# Patient Record
Sex: MALE | Race: WHITE | NOT HISPANIC OR LATINO | URBAN - METROPOLITAN AREA
[De-identification: names, ages, dates, MRNs, and addresses within clinical notes are randomized per-mention and may not be internally consistent; named-entity substitution may affect disease eponyms.]

---

## 2017-06-14 ENCOUNTER — INPATIENT (INPATIENT)
Facility: HOSPITAL | Age: 62
LOS: 3 days | Discharge: HOME | DRG: 306 | End: 2017-06-18
Attending: THORACIC SURGERY (CARDIOTHORACIC VASCULAR SURGERY) | Admitting: THORACIC SURGERY (CARDIOTHORACIC VASCULAR SURGERY)
Payer: MEDICARE

## 2017-06-14 VITALS
TEMPERATURE: 99 F | HEART RATE: 58 BPM | OXYGEN SATURATION: 99 % | SYSTOLIC BLOOD PRESSURE: 136 MMHG | RESPIRATION RATE: 18 BRPM | WEIGHT: 200.4 LBS | DIASTOLIC BLOOD PRESSURE: 54 MMHG | HEIGHT: 69 IN

## 2017-06-14 DIAGNOSIS — E78.5 HYPERLIPIDEMIA, UNSPECIFIED: ICD-10-CM

## 2017-06-14 DIAGNOSIS — I10 ESSENTIAL (PRIMARY) HYPERTENSION: ICD-10-CM

## 2017-06-14 DIAGNOSIS — I50.9 HEART FAILURE, UNSPECIFIED: ICD-10-CM

## 2017-06-14 DIAGNOSIS — Z98.890 OTHER SPECIFIED POSTPROCEDURAL STATES: Chronic | ICD-10-CM

## 2017-06-14 DIAGNOSIS — I50.33 ACUTE ON CHRONIC DIASTOLIC (CONGESTIVE) HEART FAILURE: ICD-10-CM

## 2017-06-14 DIAGNOSIS — I25.10 ATHEROSCLEROTIC HEART DISEASE OF NATIVE CORONARY ARTERY WITHOUT ANGINA PECTORIS: ICD-10-CM

## 2017-06-14 DIAGNOSIS — I34.0 NONRHEUMATIC MITRAL (VALVE) INSUFFICIENCY: ICD-10-CM

## 2017-06-14 DIAGNOSIS — R09.02 HYPOXEMIA: ICD-10-CM

## 2017-06-14 DIAGNOSIS — T82.897D OTHER SPECIFIED COMPLICATION OF CARDIAC PROSTHETIC DEVICES, IMPLANTS AND GRAFTS, SUBSEQUENT ENCOUNTER: Chronic | ICD-10-CM

## 2017-06-14 DIAGNOSIS — E11.9 TYPE 2 DIABETES MELLITUS WITHOUT COMPLICATIONS: ICD-10-CM

## 2017-06-14 DIAGNOSIS — Z89.9 ACQUIRED ABSENCE OF LIMB, UNSPECIFIED: Chronic | ICD-10-CM

## 2017-06-14 LAB
ALBUMIN SERPL ELPH-MCNC: 3.9 G/DL — SIGNIFICANT CHANGE UP (ref 3.3–5.2)
ALP SERPL-CCNC: 82 U/L — SIGNIFICANT CHANGE UP (ref 40–120)
ALT FLD-CCNC: 19 U/L — SIGNIFICANT CHANGE UP
ANION GAP SERPL CALC-SCNC: 13 MMOL/L — SIGNIFICANT CHANGE UP (ref 5–17)
APTT BLD: 37.5 SEC — HIGH (ref 27.5–37.4)
AST SERPL-CCNC: 22 U/L — SIGNIFICANT CHANGE UP
BASOPHILS # BLD AUTO: 0 K/UL — SIGNIFICANT CHANGE UP (ref 0–0.2)
BASOPHILS NFR BLD AUTO: 0.3 % — SIGNIFICANT CHANGE UP (ref 0–2)
BILIRUB SERPL-MCNC: 0.4 MG/DL — SIGNIFICANT CHANGE UP (ref 0.4–2)
BLD GP AB SCN SERPL QL: SIGNIFICANT CHANGE UP
BUN SERPL-MCNC: 37 MG/DL — HIGH (ref 8–20)
CALCIUM SERPL-MCNC: 9.4 MG/DL — SIGNIFICANT CHANGE UP (ref 8.6–10.2)
CHLORIDE SERPL-SCNC: 92 MMOL/L — LOW (ref 98–107)
CO2 SERPL-SCNC: 31 MMOL/L — HIGH (ref 22–29)
CREAT SERPL-MCNC: 1.53 MG/DL — HIGH (ref 0.5–1.3)
EOSINOPHIL # BLD AUTO: 0.3 K/UL — SIGNIFICANT CHANGE UP (ref 0–0.5)
EOSINOPHIL NFR BLD AUTO: 4.7 % — SIGNIFICANT CHANGE UP (ref 0–5)
GLUCOSE SERPL-MCNC: 219 MG/DL — HIGH (ref 70–115)
HBA1C BLD-MCNC: 8 % — HIGH (ref 4–5.6)
HCT VFR BLD CALC: 38.6 % — LOW (ref 42–52)
HGB BLD-MCNC: 12.1 G/DL — LOW (ref 14–18)
INR BLD: 1.04 RATIO — SIGNIFICANT CHANGE UP (ref 0.88–1.16)
LYMPHOCYTES # BLD AUTO: 1.1 K/UL — SIGNIFICANT CHANGE UP (ref 1–4.8)
LYMPHOCYTES # BLD AUTO: 15.5 % — LOW (ref 20–55)
MCHC RBC-ENTMCNC: 27.2 PG — SIGNIFICANT CHANGE UP (ref 27–31)
MCHC RBC-ENTMCNC: 31.3 G/DL — LOW (ref 32–36)
MCV RBC AUTO: 86.7 FL — SIGNIFICANT CHANGE UP (ref 80–94)
MONOCYTES # BLD AUTO: 0.6 K/UL — SIGNIFICANT CHANGE UP (ref 0–0.8)
MONOCYTES NFR BLD AUTO: 8.5 % — SIGNIFICANT CHANGE UP (ref 3–10)
NEUTROPHILS # BLD AUTO: 5 K/UL — SIGNIFICANT CHANGE UP (ref 1.8–8)
NEUTROPHILS NFR BLD AUTO: 70.9 % — SIGNIFICANT CHANGE UP (ref 37–73)
NT-PROBNP SERPL-SCNC: 678 PG/ML — HIGH (ref 0–300)
PLATELET # BLD AUTO: 205 K/UL — SIGNIFICANT CHANGE UP (ref 150–400)
POTASSIUM SERPL-MCNC: 5 MMOL/L — SIGNIFICANT CHANGE UP (ref 3.5–5.3)
POTASSIUM SERPL-SCNC: 5 MMOL/L — SIGNIFICANT CHANGE UP (ref 3.5–5.3)
PREALB SERPL-MCNC: 20 MG/DL — SIGNIFICANT CHANGE UP (ref 18–38)
PROT SERPL-MCNC: 7.2 G/DL — SIGNIFICANT CHANGE UP (ref 6.6–8.7)
PROTHROM AB SERPL-ACNC: 11.4 SEC — SIGNIFICANT CHANGE UP (ref 9.8–12.7)
RBC # BLD: 4.45 M/UL — LOW (ref 4.6–6.2)
RBC # FLD: 16.2 % — HIGH (ref 11–15.6)
SODIUM SERPL-SCNC: 136 MMOL/L — SIGNIFICANT CHANGE UP (ref 135–145)
TSH SERPL-MCNC: 1.95 UIU/ML — SIGNIFICANT CHANGE UP (ref 0.27–4.2)
TYPE + AB SCN PNL BLD: SIGNIFICANT CHANGE UP
WBC # BLD: 7 K/UL — SIGNIFICANT CHANGE UP (ref 4.8–10.8)
WBC # FLD AUTO: 7 K/UL — SIGNIFICANT CHANGE UP (ref 4.8–10.8)

## 2017-06-14 PROCEDURE — 99221 1ST HOSP IP/OBS SF/LOW 40: CPT

## 2017-06-14 PROCEDURE — 93010 ELECTROCARDIOGRAM REPORT: CPT

## 2017-06-14 PROCEDURE — 71010: CPT | Mod: 26

## 2017-06-14 RX ORDER — INSULIN LISPRO 100/ML
VIAL (ML) SUBCUTANEOUS
Qty: 0 | Refills: 0 | Status: DISCONTINUED | OUTPATIENT
Start: 2017-06-14 | End: 2017-06-18

## 2017-06-14 RX ORDER — ASPIRIN/CALCIUM CARB/MAGNESIUM 324 MG
325 TABLET ORAL DAILY
Qty: 0 | Refills: 0 | Status: DISCONTINUED | OUTPATIENT
Start: 2017-06-14 | End: 2017-06-18

## 2017-06-14 RX ORDER — SODIUM CHLORIDE 9 MG/ML
1000 INJECTION, SOLUTION INTRAVENOUS
Qty: 0 | Refills: 0 | Status: DISCONTINUED | OUTPATIENT
Start: 2017-06-14 | End: 2017-06-18

## 2017-06-14 RX ORDER — TRAZODONE HCL 50 MG
100 TABLET ORAL AT BEDTIME
Qty: 0 | Refills: 0 | Status: DISCONTINUED | OUTPATIENT
Start: 2017-06-14 | End: 2017-06-18

## 2017-06-14 RX ORDER — ISOSORBIDE MONONITRATE 60 MG/1
60 TABLET, EXTENDED RELEASE ORAL DAILY
Qty: 0 | Refills: 0 | Status: DISCONTINUED | OUTPATIENT
Start: 2017-06-14 | End: 2017-06-16

## 2017-06-14 RX ORDER — DEXTROSE 50 % IN WATER 50 %
25 SYRINGE (ML) INTRAVENOUS ONCE
Qty: 0 | Refills: 0 | Status: DISCONTINUED | OUTPATIENT
Start: 2017-06-14 | End: 2017-06-18

## 2017-06-14 RX ORDER — SPIRONOLACTONE 25 MG/1
25 TABLET, FILM COATED ORAL DAILY
Qty: 0 | Refills: 0 | Status: DISCONTINUED | OUTPATIENT
Start: 2017-06-14 | End: 2017-06-15

## 2017-06-14 RX ORDER — CITALOPRAM 10 MG/1
40 TABLET, FILM COATED ORAL DAILY
Qty: 0 | Refills: 0 | Status: DISCONTINUED | OUTPATIENT
Start: 2017-06-14 | End: 2017-06-18

## 2017-06-14 RX ORDER — TAMSULOSIN HYDROCHLORIDE 0.4 MG/1
0.4 CAPSULE ORAL AT BEDTIME
Qty: 0 | Refills: 0 | Status: DISCONTINUED | OUTPATIENT
Start: 2017-06-14 | End: 2017-06-18

## 2017-06-14 RX ORDER — INSULIN GLARGINE 100 [IU]/ML
30 INJECTION, SOLUTION SUBCUTANEOUS AT BEDTIME
Qty: 0 | Refills: 0 | Status: DISCONTINUED | OUTPATIENT
Start: 2017-06-14 | End: 2017-06-15

## 2017-06-14 RX ORDER — TRAZODONE HCL 50 MG
50 TABLET ORAL AT BEDTIME
Qty: 0 | Refills: 0 | Status: DISCONTINUED | OUTPATIENT
Start: 2017-06-14 | End: 2017-06-18

## 2017-06-14 RX ORDER — FUROSEMIDE 40 MG
40 TABLET ORAL
Qty: 0 | Refills: 0 | Status: DISCONTINUED | OUTPATIENT
Start: 2017-06-14 | End: 2017-06-16

## 2017-06-14 RX ORDER — METOPROLOL TARTRATE 50 MG
100 TABLET ORAL DAILY
Qty: 0 | Refills: 0 | Status: DISCONTINUED | OUTPATIENT
Start: 2017-06-14 | End: 2017-06-16

## 2017-06-14 RX ORDER — DOCUSATE SODIUM 100 MG
100 CAPSULE ORAL THREE TIMES A DAY
Qty: 0 | Refills: 0 | Status: DISCONTINUED | OUTPATIENT
Start: 2017-06-14 | End: 2017-06-18

## 2017-06-14 RX ORDER — SODIUM CHLORIDE 9 MG/ML
3 INJECTION INTRAMUSCULAR; INTRAVENOUS; SUBCUTANEOUS EVERY 8 HOURS
Qty: 0 | Refills: 0 | Status: DISCONTINUED | OUTPATIENT
Start: 2017-06-14 | End: 2017-06-18

## 2017-06-14 RX ORDER — GLUCAGON INJECTION, SOLUTION 0.5 MG/.1ML
1 INJECTION, SOLUTION SUBCUTANEOUS ONCE
Qty: 0 | Refills: 0 | Status: DISCONTINUED | OUTPATIENT
Start: 2017-06-14 | End: 2017-06-18

## 2017-06-14 RX ORDER — FLUTICASONE PROPIONATE 50 MCG
2 SPRAY, SUSPENSION NASAL DAILY
Qty: 0 | Refills: 0 | Status: DISCONTINUED | OUTPATIENT
Start: 2017-06-14 | End: 2017-06-18

## 2017-06-14 RX ORDER — DEXTROSE 50 % IN WATER 50 %
1 SYRINGE (ML) INTRAVENOUS ONCE
Qty: 0 | Refills: 0 | Status: DISCONTINUED | OUTPATIENT
Start: 2017-06-14 | End: 2017-06-18

## 2017-06-14 RX ORDER — GABAPENTIN 400 MG/1
400 CAPSULE ORAL
Qty: 0 | Refills: 0 | Status: DISCONTINUED | OUTPATIENT
Start: 2017-06-14 | End: 2017-06-18

## 2017-06-14 RX ORDER — ATORVASTATIN CALCIUM 80 MG/1
80 TABLET, FILM COATED ORAL AT BEDTIME
Qty: 0 | Refills: 0 | Status: DISCONTINUED | OUTPATIENT
Start: 2017-06-14 | End: 2017-06-18

## 2017-06-14 RX ORDER — HEPARIN SODIUM 5000 [USP'U]/ML
5000 INJECTION INTRAVENOUS; SUBCUTANEOUS EVERY 8 HOURS
Qty: 0 | Refills: 0 | Status: DISCONTINUED | OUTPATIENT
Start: 2017-06-14 | End: 2017-06-18

## 2017-06-14 RX ORDER — INSULIN LISPRO 100/ML
5 VIAL (ML) SUBCUTANEOUS
Qty: 0 | Refills: 0 | Status: DISCONTINUED | OUTPATIENT
Start: 2017-06-14 | End: 2017-06-15

## 2017-06-14 RX ORDER — DEXTROSE 50 % IN WATER 50 %
12.5 SYRINGE (ML) INTRAVENOUS ONCE
Qty: 0 | Refills: 0 | Status: DISCONTINUED | OUTPATIENT
Start: 2017-06-14 | End: 2017-06-18

## 2017-06-14 RX ORDER — CLOPIDOGREL BISULFATE 75 MG/1
75 TABLET, FILM COATED ORAL DAILY
Qty: 0 | Refills: 0 | Status: DISCONTINUED | OUTPATIENT
Start: 2017-06-14 | End: 2017-06-18

## 2017-06-14 RX ORDER — PANTOPRAZOLE SODIUM 20 MG/1
40 TABLET, DELAYED RELEASE ORAL
Qty: 0 | Refills: 0 | Status: DISCONTINUED | OUTPATIENT
Start: 2017-06-14 | End: 2017-06-18

## 2017-06-14 RX ORDER — ACETAMINOPHEN 500 MG
650 TABLET ORAL EVERY 6 HOURS
Qty: 0 | Refills: 0 | Status: DISCONTINUED | OUTPATIENT
Start: 2017-06-14 | End: 2017-06-18

## 2017-06-14 RX ADMIN — INSULIN GLARGINE 30 UNIT(S): 100 INJECTION, SOLUTION SUBCUTANEOUS at 23:40

## 2017-06-14 RX ADMIN — SODIUM CHLORIDE 3 MILLILITER(S): 9 INJECTION INTRAMUSCULAR; INTRAVENOUS; SUBCUTANEOUS at 23:06

## 2017-06-14 RX ADMIN — HEPARIN SODIUM 5000 UNIT(S): 5000 INJECTION INTRAVENOUS; SUBCUTANEOUS at 23:41

## 2017-06-14 RX ADMIN — TAMSULOSIN HYDROCHLORIDE 0.4 MILLIGRAM(S): 0.4 CAPSULE ORAL at 23:43

## 2017-06-14 RX ADMIN — Medication 50 MILLIGRAM(S): at 23:44

## 2017-06-14 RX ADMIN — Medication 2 MILLIGRAM(S): at 23:41

## 2017-06-14 RX ADMIN — Medication 100 MILLIGRAM(S): at 23:43

## 2017-06-14 RX ADMIN — ATORVASTATIN CALCIUM 80 MILLIGRAM(S): 80 TABLET, FILM COATED ORAL at 23:42

## 2017-06-14 RX ADMIN — Medication 100 MILLIGRAM(S): at 23:44

## 2017-06-14 RX ADMIN — GABAPENTIN 400 MILLIGRAM(S): 400 CAPSULE ORAL at 23:42

## 2017-06-14 NOTE — H&P ADULT - PSH
Coronary stent occlusion, subsequent encounter    History of amputation  1/2 LLE great toe  1st and 2nd toes rt foot  History of cataract extraction    History of coronary artery bypass graft  TMR, 2001 [place]  History of transmyocardial revascularization    History of wrist fracture  left repaired

## 2017-06-14 NOTE — H&P ADULT - FAMILY HISTORY
Sibling  Still living? Yes, Estimated age: Age Unknown  Family history of CABG, Age at diagnosis: Age Unknown     Mother  Still living? Unknown  Family history of diabetes mellitus (DM), Age at diagnosis: Age Unknown

## 2017-06-14 NOTE — H&P ADULT - NSHPREVIEWOFSYSTEMS_GEN_ALL_CORE
General: alert awake, + Fatigue Claudication  Eyes: + Glasses No blurry vision   ENT : - hearing loss, - drainage  CVS :- CP, -palpitations,   Resp: + wheezing + SOB, - cough  GI: -N&V, - Appetite change   : -Frequency, -Dysuria    Psych: + Depression   Extremities: +Claudication + neuropathy + Edema b/l LE

## 2017-06-14 NOTE — H&P ADULT - PMH
Hooper's esophagus    CAD (coronary artery disease)  multiple MI, PCI, CABG/TMR  Charcot foot due to diabetes mellitus    CKD (chronic kidney disease) stage 2, GFR 60-89 ml/min    COPD (chronic obstructive pulmonary disease)    Diastolic dysfunction with acute on chronic heart failure    DM (diabetes mellitus)    Dyslipidemia    GERD (gastroesophageal reflux disease)    HLD (hyperlipidemia)    HTN (hypertension)    Hypoxia    Ischemic cardiomyopathy    Memory loss, short term    Morbid obesity    Non-rheumatic mitral regurgitation    NSTEMI (non-ST elevated myocardial infarction)    Obesity    PAD (peripheral artery disease)    Retinopathy    Sleep apnea    Spinal stenosis

## 2017-06-14 NOTE — H&P ADULT - NSHPPHYSICALEXAM_GEN_ALL_CORE
neuro:  Alert, awake NAD  Resp:  Decreased BS at base b/l  clear  Card:  RRR S1 S2 + MSI scar   Abd:  Soft NT ND + BS   Ext:  + Rt Charcot foot with great toe amputated, Left foot +tip of great toe amputated + DP pulse b/l 1+           1-2 + pitting edema b/l LE

## 2017-06-14 NOTE — H&P ADULT - ASSESSMENT
61 y/o M with h/o CAD with acute on chronic diastolic heart failure, presents with moderate to severe MR and transferred here for surgical evaluation with Dr Faulkner

## 2017-06-14 NOTE — H&P ADULT - ATTENDING COMMENTS
Agree with above.  Will get MARGARETH, PFTs, Pulmonary consult.  Evaluate possibility of surgical MVR or other percutaneous possibilities.

## 2017-06-14 NOTE — H&P ADULT - HISTORY OF PRESENT ILLNESS
This is a 61 y/o M with h/o known diastolic heart failure moderate MR ( ECHO in May) had a recent admission at [facility],  discharged on 5/31 and readmitted on 6/7 with CHF. Pt has a h/o of Dm ( on Insulin) multiple PCI ( on plavix) HTN, HLD, depression, CKD, PAD, GERD, TMR X 2,  Neuropathy, Charcot foot,  STML & balance issues  for which he sees a neurologist,  Home O2 the last  2 months for CHF ( Dr Gong placed pt on).  Pt was seen by Dr Gong in the office and advised pt to go to [facility] for Acute Decompensated  diastolic Heart Failure.  Pt states he feels better now being that hes been on lasix since admission and noticed that his LE edema has gone down.  Denies CP, HA, N & V, dizziness

## 2017-06-15 DIAGNOSIS — E11.52 TYPE 2 DIABETES MELLITUS WITH DIABETIC PERIPHERAL ANGIOPATHY WITH GANGRENE: ICD-10-CM

## 2017-06-15 DIAGNOSIS — J43.9 EMPHYSEMA, UNSPECIFIED: ICD-10-CM

## 2017-06-15 DIAGNOSIS — I10 ESSENTIAL (PRIMARY) HYPERTENSION: ICD-10-CM

## 2017-06-15 DIAGNOSIS — N18.2 CHRONIC KIDNEY DISEASE, STAGE 2 (MILD): ICD-10-CM

## 2017-06-15 DIAGNOSIS — I50.31 ACUTE DIASTOLIC (CONGESTIVE) HEART FAILURE: ICD-10-CM

## 2017-06-15 DIAGNOSIS — I25.118 ATHEROSCLEROTIC HEART DISEASE OF NATIVE CORONARY ARTERY WITH OTHER FORMS OF ANGINA PECTORIS: ICD-10-CM

## 2017-06-15 LAB
ALBUMIN SERPL ELPH-MCNC: 3.5 G/DL — SIGNIFICANT CHANGE UP (ref 3.3–5.2)
ALP SERPL-CCNC: 75 U/L — SIGNIFICANT CHANGE UP (ref 40–120)
ALT FLD-CCNC: 19 U/L — SIGNIFICANT CHANGE UP
ANION GAP SERPL CALC-SCNC: 9 MMOL/L — SIGNIFICANT CHANGE UP (ref 5–17)
AST SERPL-CCNC: 20 U/L — SIGNIFICANT CHANGE UP
BASE EXCESS BLDA CALC-SCNC: 5.2 MMOL/L — HIGH (ref -3–3)
BASOPHILS # BLD AUTO: 0 K/UL — SIGNIFICANT CHANGE UP (ref 0–0.2)
BASOPHILS NFR BLD AUTO: 0.3 % — SIGNIFICANT CHANGE UP (ref 0–2)
BILIRUB SERPL-MCNC: 0.4 MG/DL — SIGNIFICANT CHANGE UP (ref 0.4–2)
BLOOD GAS COMMENTS ARTERIAL: SIGNIFICANT CHANGE UP
BUN SERPL-MCNC: 35 MG/DL — HIGH (ref 8–20)
CALCIUM SERPL-MCNC: 9.3 MG/DL — SIGNIFICANT CHANGE UP (ref 8.6–10.2)
CHLORIDE SERPL-SCNC: 95 MMOL/L — LOW (ref 98–107)
CO2 SERPL-SCNC: 34 MMOL/L — HIGH (ref 22–29)
CREAT SERPL-MCNC: 1.39 MG/DL — HIGH (ref 0.5–1.3)
EOSINOPHIL # BLD AUTO: 0.4 K/UL — SIGNIFICANT CHANGE UP (ref 0–0.5)
EOSINOPHIL NFR BLD AUTO: 5.6 % — HIGH (ref 0–5)
GAS PNL BLDA: SIGNIFICANT CHANGE UP
GLUCOSE SERPL-MCNC: 164 MG/DL — HIGH (ref 70–115)
HBA1C BLD-MCNC: 8 % — HIGH (ref 4–5.6)
HCO3 BLDA-SCNC: 29 MMOL/L — HIGH (ref 20–26)
HCT VFR BLD CALC: 37.6 % — LOW (ref 42–52)
HGB BLD-MCNC: 11.9 G/DL — LOW (ref 14–18)
HOROWITZ INDEX BLDA+IHG-RTO: SIGNIFICANT CHANGE UP
LYMPHOCYTES # BLD AUTO: 1.2 K/UL — SIGNIFICANT CHANGE UP (ref 1–4.8)
LYMPHOCYTES # BLD AUTO: 18.6 % — LOW (ref 20–55)
MCHC RBC-ENTMCNC: 27.5 PG — SIGNIFICANT CHANGE UP (ref 27–31)
MCHC RBC-ENTMCNC: 31.6 G/DL — LOW (ref 32–36)
MCV RBC AUTO: 86.8 FL — SIGNIFICANT CHANGE UP (ref 80–94)
MONOCYTES # BLD AUTO: 0.6 K/UL — SIGNIFICANT CHANGE UP (ref 0–0.8)
MONOCYTES NFR BLD AUTO: 9.1 % — SIGNIFICANT CHANGE UP (ref 3–10)
NEUTROPHILS # BLD AUTO: 4.2 K/UL — SIGNIFICANT CHANGE UP (ref 1.8–8)
NEUTROPHILS NFR BLD AUTO: 66.2 % — SIGNIFICANT CHANGE UP (ref 37–73)
PA ADP PRP-ACNC: 123 PRU — SIGNIFICANT CHANGE UP (ref 14–435)
PCO2 BLDA: 50 MMHG — HIGH (ref 35–45)
PH BLDA: 7.4 — SIGNIFICANT CHANGE UP (ref 7.35–7.45)
PLATELET # BLD AUTO: 185 K/UL — SIGNIFICANT CHANGE UP (ref 150–400)
PO2 BLDA: 53 MMHG — LOW (ref 83–108)
POTASSIUM SERPL-MCNC: 4.1 MMOL/L — SIGNIFICANT CHANGE UP (ref 3.5–5.3)
POTASSIUM SERPL-SCNC: 4.1 MMOL/L — SIGNIFICANT CHANGE UP (ref 3.5–5.3)
PROT SERPL-MCNC: 6.6 G/DL — SIGNIFICANT CHANGE UP (ref 6.6–8.7)
RBC # BLD: 4.33 M/UL — LOW (ref 4.6–6.2)
RBC # FLD: 15.9 % — HIGH (ref 11–15.6)
SAO2 % BLDA: 86 % — LOW (ref 95–99)
SODIUM SERPL-SCNC: 138 MMOL/L — SIGNIFICANT CHANGE UP (ref 135–145)
WBC # BLD: 6.3 K/UL — SIGNIFICANT CHANGE UP (ref 4.8–10.8)
WBC # FLD AUTO: 6.3 K/UL — SIGNIFICANT CHANGE UP (ref 4.8–10.8)

## 2017-06-15 PROCEDURE — 71250 CT THORAX DX C-: CPT | Mod: 26

## 2017-06-15 RX ORDER — INSULIN LISPRO 100/ML
5 VIAL (ML) SUBCUTANEOUS
Qty: 0 | Refills: 0 | Status: DISCONTINUED | OUTPATIENT
Start: 2017-06-15 | End: 2017-06-18

## 2017-06-15 RX ORDER — INSULIN GLARGINE 100 [IU]/ML
15 INJECTION, SOLUTION SUBCUTANEOUS ONCE
Qty: 0 | Refills: 0 | Status: COMPLETED | OUTPATIENT
Start: 2017-06-15 | End: 2017-06-15

## 2017-06-15 RX ORDER — BUDESONIDE, MICRONIZED 100 %
1 POWDER (GRAM) MISCELLANEOUS DAILY
Qty: 0 | Refills: 0 | Status: DISCONTINUED | OUTPATIENT
Start: 2017-06-15 | End: 2017-06-15

## 2017-06-15 RX ORDER — INSULIN GLARGINE 100 [IU]/ML
30 INJECTION, SOLUTION SUBCUTANEOUS AT BEDTIME
Qty: 0 | Refills: 0 | Status: DISCONTINUED | OUTPATIENT
Start: 2017-06-16 | End: 2017-06-18

## 2017-06-15 RX ORDER — TIOTROPIUM BROMIDE 18 UG/1
1 CAPSULE ORAL; RESPIRATORY (INHALATION) DAILY
Qty: 0 | Refills: 0 | Status: DISCONTINUED | OUTPATIENT
Start: 2017-06-15 | End: 2017-06-18

## 2017-06-15 RX ORDER — MOMETASONE FUROATE 220 UG/1
1 INHALANT RESPIRATORY (INHALATION) DAILY
Qty: 0 | Refills: 0 | Status: DISCONTINUED | OUTPATIENT
Start: 2017-06-15 | End: 2017-06-18

## 2017-06-15 RX ORDER — SPIRONOLACTONE 25 MG/1
25 TABLET, FILM COATED ORAL
Qty: 0 | Refills: 0 | Status: DISCONTINUED | OUTPATIENT
Start: 2017-06-15 | End: 2017-06-16

## 2017-06-15 RX ADMIN — INSULIN GLARGINE 15 UNIT(S): 100 INJECTION, SOLUTION SUBCUTANEOUS at 21:37

## 2017-06-15 RX ADMIN — SODIUM CHLORIDE 3 MILLILITER(S): 9 INJECTION INTRAMUSCULAR; INTRAVENOUS; SUBCUTANEOUS at 06:37

## 2017-06-15 RX ADMIN — GABAPENTIN 400 MILLIGRAM(S): 400 CAPSULE ORAL at 17:27

## 2017-06-15 RX ADMIN — Medication 100 MILLIGRAM(S): at 06:46

## 2017-06-15 RX ADMIN — Medication 325 MILLIGRAM(S): at 14:19

## 2017-06-15 RX ADMIN — GABAPENTIN 400 MILLIGRAM(S): 400 CAPSULE ORAL at 14:24

## 2017-06-15 RX ADMIN — Medication 2: at 17:27

## 2017-06-15 RX ADMIN — ISOSORBIDE MONONITRATE 60 MILLIGRAM(S): 60 TABLET, EXTENDED RELEASE ORAL at 14:19

## 2017-06-15 RX ADMIN — GABAPENTIN 400 MILLIGRAM(S): 400 CAPSULE ORAL at 06:46

## 2017-06-15 RX ADMIN — ATORVASTATIN CALCIUM 80 MILLIGRAM(S): 80 TABLET, FILM COATED ORAL at 21:36

## 2017-06-15 RX ADMIN — SPIRONOLACTONE 25 MILLIGRAM(S): 25 TABLET, FILM COATED ORAL at 06:46

## 2017-06-15 RX ADMIN — Medication 100 MILLIGRAM(S): at 23:42

## 2017-06-15 RX ADMIN — Medication 6: at 14:20

## 2017-06-15 RX ADMIN — Medication 10 MILLIGRAM(S): at 06:46

## 2017-06-15 RX ADMIN — Medication 100 MILLIGRAM(S): at 21:36

## 2017-06-15 RX ADMIN — SODIUM CHLORIDE 3 MILLILITER(S): 9 INJECTION INTRAMUSCULAR; INTRAVENOUS; SUBCUTANEOUS at 14:24

## 2017-06-15 RX ADMIN — Medication 40 MILLIGRAM(S): at 06:46

## 2017-06-15 RX ADMIN — Medication 5 UNIT(S): at 14:21

## 2017-06-15 RX ADMIN — HEPARIN SODIUM 5000 UNIT(S): 5000 INJECTION INTRAVENOUS; SUBCUTANEOUS at 06:46

## 2017-06-15 RX ADMIN — HEPARIN SODIUM 5000 UNIT(S): 5000 INJECTION INTRAVENOUS; SUBCUTANEOUS at 21:37

## 2017-06-15 RX ADMIN — SODIUM CHLORIDE 3 MILLILITER(S): 9 INJECTION INTRAMUSCULAR; INTRAVENOUS; SUBCUTANEOUS at 21:40

## 2017-06-15 RX ADMIN — CITALOPRAM 40 MILLIGRAM(S): 10 TABLET, FILM COATED ORAL at 16:14

## 2017-06-15 RX ADMIN — Medication 2 SPRAY(S): at 17:28

## 2017-06-15 RX ADMIN — Medication 5 UNIT(S): at 17:29

## 2017-06-15 RX ADMIN — TAMSULOSIN HYDROCHLORIDE 0.4 MILLIGRAM(S): 0.4 CAPSULE ORAL at 21:36

## 2017-06-15 RX ADMIN — HEPARIN SODIUM 5000 UNIT(S): 5000 INJECTION INTRAVENOUS; SUBCUTANEOUS at 14:19

## 2017-06-15 RX ADMIN — GABAPENTIN 400 MILLIGRAM(S): 400 CAPSULE ORAL at 23:42

## 2017-06-15 RX ADMIN — Medication 2 MILLIGRAM(S): at 23:43

## 2017-06-15 RX ADMIN — PANTOPRAZOLE SODIUM 40 MILLIGRAM(S): 20 TABLET, DELAYED RELEASE ORAL at 06:46

## 2017-06-15 RX ADMIN — CLOPIDOGREL BISULFATE 75 MILLIGRAM(S): 75 TABLET, FILM COATED ORAL at 14:24

## 2017-06-15 RX ADMIN — Medication 50 MILLIGRAM(S): at 23:42

## 2017-06-15 NOTE — CONSULT NOTE ADULT - ASSESSMENT
Patient with history of increased dyspnea likely related to cardiac disease but with likely shunting based on findings on CT of loculated RLL pleural disease.  Has emphysema as well.  PFT is not useful due to poor study,    Plan:  1.ABG on room air  2.Trial of Spiriva and Pulmicort>patient cannot tolerate albuterol>tremors  3.Consider repeat spirometry study

## 2017-06-15 NOTE — CONSULT NOTE ADULT - SUBJECTIVE AND OBJECTIVE BOX
PULMONARY CONSULT NOTE      BIBI BROOKS-[id number]    Patient is a 62y old  Male who presents with a chief complaint of Transfer from [facility] 2 Mths (14 Jun 2017 18:55)  Patient with multiple medical issues with increased dyspnea and need for O2 since last 2 months where he was having recurrent episodes of CHF.  Has multiple stents, has had CABG, has diabetes and peripheral vascular disease.  Exsmoker of 1 PPD until 5 years ago but denies known primary pulmonary disease.  +cough.  No chest pain.  Spirometry study done today with poor results but patient had poor effort.  Study was reviewed and is uninterpretable with impossible data     INTERVAL HPI/OVERNIGHT EVENTS:    MEDICATIONS  (STANDING):  sodium chloride 0.9% lock flush 3milliLiter(s) IV Push every 8 hours  aspirin 325milliGRAM(s) Oral daily  enalapril 10milliGRAM(s) Oral daily  isosorbide   mononitrate ER Tablet (IMDUR) 60milliGRAM(s) Oral daily  gabapentin 400milliGRAM(s) Oral four times a day  LORazepam     Tablet 2milliGRAM(s) Oral at bedtime  citalopram 40milliGRAM(s) Oral daily  traZODone 50milliGRAM(s) Oral at bedtime  traZODone 100milliGRAM(s) Oral at bedtime  insulin glargine Injectable (LANTUS) 30Unit(s) SubCutaneous at bedtime  atorvastatin 80milliGRAM(s) Oral at bedtime  clopidogrel Tablet 75milliGRAM(s) Oral daily  metoprolol succinate ER 100milliGRAM(s) Oral daily  fluticasone propionate 50 MICROgram(s)/spray Nasal Spray 2Spray(s) Both Nostrils daily  pantoprazole    Tablet 40milliGRAM(s) Oral before breakfast  insulin lispro (HumaLOG) corrective regimen sliding scale  SubCutaneous three times a day before meals  dextrose 5%. 1000milliLiter(s) IV Continuous <Continuous>  dextrose 50% Injectable 12.5Gram(s) IV Push once  dextrose 50% Injectable 25Gram(s) IV Push once  dextrose 50% Injectable 25Gram(s) IV Push once  docusate sodium 100milliGRAM(s) Oral three times a day  heparin  Injectable 5000Unit(s) SubCutaneous every 8 hours  tamsulosin 0.4milliGRAM(s) Oral at bedtime  furosemide    Tablet 40milliGRAM(s) Oral two times a day  insulin lispro Injectable (HumaLOG) 5Unit(s) SubCutaneous three times a day before meals  spironolactone 25milliGRAM(s) Oral two times a day      MEDICATIONS  (PRN):  dextrose Gel 1Dose(s) Oral once PRN Blood Glucose LESS THAN 70 milliGRAM(s)/deciLiter  glucagon  Injectable 1milliGRAM(s) IntraMuscular once PRN Glucose <70 milliGRAM(s)/deciLiter  acetaminophen   Tablet. 650milliGRAM(s) Oral every 6 hours PRN Mild Pain (1 - 3)      Allergies    No Known Allergies    Intolerances        PAST MEDICAL & SURGICAL HISTORY:  Spinal stenosis  Sleep apnea  Memory loss, short term  Hypoxia  Obesity  NSTEMI (non-ST elevated myocardial infarction)  Non-rheumatic mitral regurgitation  Ischemic cardiomyopathy  HLD (hyperlipidemia)  GERD (gastroesophageal reflux disease)  Diastolic dysfunction with acute on chronic heart failure  COPD (chronic obstructive pulmonary disease)  Charcot foot due to diabetes mellitus  CKD (chronic kidney disease) stage 2, GFR 60-89 ml/min  Hooper&#x27;s esophagus  PAD (peripheral artery disease)  CAD (coronary artery disease): multiple MI, PCI, CABG/TMR  Morbid obesity  Retinopathy  DM (diabetes mellitus)  Dyslipidemia  HTN (hypertension)  Coronary stent occlusion, subsequent encounter  History of transmyocardial revascularization  History of coronary artery bypass graft: TMR, 2001 [place]  History of amputation: 1/2 LLE great toe  1st and 2nd toes rt foot  History of wrist fracture: left repaired  History of cataract extraction      FAMILY HISTORY:  Family history of diabetes mellitus (DM) (Mother): Mother  Family history of CABG (Sibling): sister      SOCIAL HISTORY  Smoking History:     REVIEW OF SYSTEMS:    CONSTITUTIONAL:  As per HPI.    HEENT:  Eyes:  No diplopia or blurred vision. ENT:  No earache, sore throat or runny nose.    CARDIOVASCULAR:  No pressure, squeezing, tightness, heaviness or aching about the chest; no palpitations.    RESPIRATORY:  Per hpi    GASTROINTESTINAL:  No nausea, vomiting or diarrhea.    GENITOURINARY:  No dysuria, frequency or urgency.    MUSCULOSKELETAL:  No joint pains    SKIN:  No new lesions.    NEUROLOGIC:  No paresthesias, fasciculations, seizures or weakness.    PSYCHIATRIC:  No disorder of thought or mood.    ENDOCRINE:  No heat or cold intolerance, polyuria or polydipsia.    HEMATOLOGICAL:  No easy bruising or bleeding.     Vital Signs Last 24 Hrs  T(C): 36.6, Max: 37 (06-14 @ 18:35)  T(F): 97.9, Max: 98.6 (06-14 @ 18:35)  HR: 68 (58 - 68)  BP: 110/58 (102/50 - 136/54)  BP(mean): --  RR: 16 (16 - 18)  SpO2: 95% (95% - 99%)    PHYSICAL EXAMINATION:    GENERAL: The patient is a well-developed, well-nourished _____in no apparent distress.     HEENT: Head is normocephalic and atraumatic. Extraocular muscles are intact. Mucous membranes are moist.     NECK: Supple.     LUNGS: Clear to auscultation without wheezing, rales, or rhonchi. Respirations unlabored; decreased at right lower lung field approximately 1/3 up. Left clear     HEART: Regular rate and rhythm  +murmur.    ABDOMEN: Soft, nontender, and nondistended.  No hepatosplenomegaly is noted.    EXTREMITIES: Without any cyanosis, clubbing, rash, lesions or edema.    NEUROLOGIC: Grossly intact.    SKIN: No ulceration or induration present.      LABS:                        11.9   6.3   )-----------( 185      ( 15 Adarsh 2017 04:44 )             37.6     06-15    138  |  95<L>  |  35.0<H>  ----------------------------<  164<H>  4.1   |  34.0<H>  |  1.39<H>    Ca    9.3      15 Adarsh 2017 04:44    TPro  6.6  /  Alb  3.5  /  TBili  0.4  /  DBili  x   /  AST  20  /  ALT  19  /  AlkPhos  75  06-15    PT/INR - ( 14 Jun 2017 20:44 )   PT: 11.4 sec;   INR: 1.04 ratio         PTT - ( 14 Jun 2017 20:44 )  PTT:37.5 sec            Serum Pro-Brain Natriuretic Peptide: 678 pg/mL (06-14 @ 20:44)          MICROBIOLOGY:    RADIOLOGY & ADDITIONAL STUDIES:   EXAM:  CHEST SINGLE VIEW FRONTAL                          PROCEDURE DATE:  06/14/2017        INTERPRETATION:  Portable chest radiograph        CLINICAL INFORMATION:   Short of breath.    TECHNIQUE:  Portable  AP view of the chest was obtained.    COMPARISON: No previous examinations are available for review.    FINDINGS:   The lungs  are clear.  No pleural abnormality is seen.         The  heart is enlarged in transverse diameter. No hilar mass. Trachea   midline.  Status post coronary artery bypass graft procedure.   .         Visualized osseous structures are intact.        IMPRESSION:   No evidence of active chest disease.          CT of chest:Report pending>personal review; evidence of emphysema noted.  RLL with loculated pleural process affecting the lateral and lower RLL; evidence of herniation of lung left lateral chest wall at level of lingula.

## 2017-06-15 NOTE — PROGRESS NOTE ADULT - SUBJECTIVE AND OBJECTIVE BOX
Subjective: "I know you want to do what is easiest but I would really like to have the surgery. I don't think the clip is going to be effective." Pt complains of occasional chest "twinges" and sob if he ambulates around the room off of oxygen.    VITAL SIGNS  Vital Signs Last 24 Hrs  T(C): 36.6, Max: 37 (06-14 @ 18:35)  T(F): 97.9, Max: 98.6 (06-14 @ 18:35)  HR: 68 (58 - 68)  BP: 110/58 (102/50 - 136/54)  RR: 16 (16 - 18)  SpO2: 95% (95% - 99%) 3L NC    Telemetry/Alarms:  SB 50-60s   LVEF:     MEDICATIONS  sodium chloride 0.9% lock flush 3milliLiter(s) IV Push every 8 hours  aspirin 325milliGRAM(s) Oral daily  enalapril 10milliGRAM(s) Oral daily  isosorbide   mononitrate ER Tablet (IMDUR) 60milliGRAM(s) Oral daily  gabapentin 400milliGRAM(s) Oral four times a day  LORazepam     Tablet 2milliGRAM(s) Oral at bedtime  citalopram 40milliGRAM(s) Oral daily  traZODone 50milliGRAM(s) Oral at bedtime  traZODone 100milliGRAM(s) Oral at bedtime  insulin glargine Injectable (LANTUS) 30Unit(s) SubCutaneous at bedtime  atorvastatin 80milliGRAM(s) Oral at bedtime  clopidogrel Tablet 75milliGRAM(s) Oral daily  metoprolol succinate ER 100milliGRAM(s) Oral daily  fluticasone propionate 50 MICROgram(s)/spray Nasal Spray 2Spray(s) Both Nostrils daily  pantoprazole    Tablet 40milliGRAM(s) Oral before breakfast  insulin lispro (HumaLOG) corrective regimen sliding scale  SubCutaneous three times a day before meals  dextrose 5%. 1000milliLiter(s) IV Continuous <Continuous>  dextrose Gel 1Dose(s) Oral once PRN  dextrose 50% Injectable 12.5Gram(s) IV Push once  dextrose 50% Injectable 25Gram(s) IV Push once  dextrose 50% Injectable 25Gram(s) IV Push once  glucagon  Injectable 1milliGRAM(s) IntraMuscular once PRN  docusate sodium 100milliGRAM(s) Oral three times a day  acetaminophen   Tablet. 650milliGRAM(s) Oral every 6 hours PRN  heparin  Injectable 5000Unit(s) SubCutaneous every 8 hours  tamsulosin 0.4milliGRAM(s) Oral at bedtime  furosemide    Tablet 40milliGRAM(s) Oral two times a day  insulin lispro Injectable (HumaLOG) 5Unit(s) SubCutaneous three times a day before meals  spironolactone 25milliGRAM(s) Oral two times a day  tiotropium 18 MICROgram(s) Capsule 1Capsule(s) Inhalation daily  buDESOnide  180 MICROgram(s) Inhaler 1Puff(s) Inhalation daily      PHYSICAL EXAM  General: well nourished, well developed, no acute distress  Neurology: alert and oriented x 3, nonfocal, no gross deficits  Respiratory: clear to auscultation bilaterally  CV: regular rate and rhythm, normal S1, S2, mild systolic murmur at LLSB, healed sternal incision  Abdomen: soft, nontender, nondistended, positive bowel sounds  Extremities: warm, well perfused. 1+ edema. + DP pulses +b/l     I & Os for 24h ending 06-15 @ 07:00  =============================================  IN: 0 ml / OUT: 600 ml / NET: -600 ml    I & Os for current day (as of 06-15 @ 13:47)  =============================================  IN: 240 ml / OUT: 300 ml / NET: -60 ml      Weights:  Daily Height in cm: 175.26 (2017 18:35)    Daily Weight in k.9 (15 Adarsh 2017 06:52)  Admit Wt: Drug Dosing Weight  Height (cm): 175.3 (2017 18:35)  Weight (kg): 90.9 (2017 18:35)  BMI (kg/m2): 29.6 (2017 18:35)  BSA (m2): 2.07 (2017 18:35)    LABS  06-15    138  |  95<L>  |  35.0<H>  ----------------------------<  164<H>  4.1   |  34.0<H>  |  1.39<H>    Ca    9.3      15 Adarsh 2017 04:44    TPro  6.6  /  Alb  3.5  /  TBili  0.4  /  DBili  x   /  AST  20  /  ALT  19  /  AlkPhos  75  06-15                                 11.9   6.3   )-----------( 185      ( 15 Adarsh 2017 04:44 )             37.6          PT/INR - ( 2017 20:44 )   PT: 11.4 sec;   INR: 1.04 ratio         PTT - ( 2017 20:44 )  PTT:37.5 sec  Bilirubin Total, Serum: 0.4 mg/dL (06-15 @ 04:44)  Bilirubin Total, Serum: 0.4 mg/dL ( @ 20:44)    Prealbumin, Serum: 20 mg/dL ( @ 20:44)    Hemoglobin A1C, Whole Blood: 8.0 % (06-15 @ 04:44)  Hemoglobin A1C, Whole Blood: 8.0 % ( @ 20:44)    Serum Pro-Brain Natriuretic Peptide: 678 pg/mL ( @ 20:44)    Glucoses: 172 / 180    Last CXR: FINDINGS:   The lungs  are clear.  No pleural abnormality is seen.         The  heart is enlarged in transverse diameter. No hilar mass. Trachea   midline.  Status post coronary artery bypass graft procedure.   .         Visualized osseous structures are intact.        IMPRESSION:   No evidence of active chest disease.            Last EKG: Normal sinus rhythm  Minimal voltage criteria for LVH, may be normal variant  Nonspecific T wave abnormality  Prolonged QT  Abnormal ECG    Confirmed by Zoltan Kwon ([id number]) on 6/15/2017 11:46:58 AM    Echo: Pending today (MARGARETH Tomorrow)    Cath: VENTRICLES: No left ventriculogram was performed.  CORONARY VESSELS: The coronary circulation is right dominant.  LM:   --  LM: Luminal.  LAD:   --  LAD: There is a proximal 70% to 80% in-stent stenosis leading to  a large Diagonal branch. The LAD itself is totally occluded.  --  Proximal LAD: There was a 80 % stenosis.  CX:   --  Circumflex: Not injected. The vessel is known to be occluded. The  SVG is known to be patent.  RCA:   --  RCA: Not injected. The vessel is known to be occluced. The SVG  to the RPDA is known be patent.  COMPLICATIONS: There were no complications.  SUMMARY:  1ST LESION INTERVENTIONS: The LAD was treated with an Angiosculpt balloon  15mm-3.0mm at 13 atms. Luly therapy was performed for 3min and 42 sec  delivering 18.4 Gr. The lesion was post treated with an NC 3.0mm balloon  at 24atms. It was reduced to 0%.  DIAGNOSTIC RECOMMENDATIONS: Plavix 75mg daily, ASA 325mg.  INTERVENTIONAL RECOMMENDATIONS: Plavix 75mg daily, ASA 325mg.  Prepared and signed by  Delgado Hamm M.D.  Signed 2014 13:36:09    PAST MEDICAL & SURGICAL HISTORY:  Spinal stenosis  Sleep apnea  Memory loss, short term  Hypoxia  Obesity  NSTEMI (non-ST elevated myocardial infarction)  Non-rheumatic mitral regurgitation  Ischemic cardiomyopathy  HLD (hyperlipidemia)  GERD (gastroesophageal reflux disease)  Diastolic dysfunction with acute on chronic heart failure  COPD (chronic obstructive pulmonary disease)  Charcot foot due to diabetes mellitus  CKD (chronic kidney disease) stage 2, GFR 60-89 ml/min  Hooper&#x27;s esophagus  PAD (peripheral artery disease)  CAD (coronary artery disease): multiple MI, PCI, CABG/TMR  Morbid obesity  Retinopathy  DM (diabetes mellitus)  Dyslipidemia  HTN (hypertension)  Coronary stent occlusion, subsequent encounter  History of transmyocardial revascularization  History of coronary artery bypass graft: TMR,  [place]  History of amputation: 1/2 LLE great toe  1st and 2nd toes rt foot  History of wrist fracture: left repaired  History of cataract extraction

## 2017-06-16 DIAGNOSIS — Z29.9 ENCOUNTER FOR PROPHYLACTIC MEASURES, UNSPECIFIED: ICD-10-CM

## 2017-06-16 DIAGNOSIS — K59.01 SLOW TRANSIT CONSTIPATION: ICD-10-CM

## 2017-06-16 DIAGNOSIS — I73.9 PERIPHERAL VASCULAR DISEASE, UNSPECIFIED: ICD-10-CM

## 2017-06-16 DIAGNOSIS — N40.0 BENIGN PROSTATIC HYPERPLASIA WITHOUT LOWER URINARY TRACT SYMPTOMS: ICD-10-CM

## 2017-06-16 LAB
ANION GAP SERPL CALC-SCNC: 8 MMOL/L — SIGNIFICANT CHANGE UP (ref 5–17)
APPEARANCE UR: CLEAR — SIGNIFICANT CHANGE UP
BACTERIA # UR AUTO: ABNORMAL
BILIRUB UR-MCNC: NEGATIVE — SIGNIFICANT CHANGE UP
BUN SERPL-MCNC: 54 MG/DL — HIGH (ref 8–20)
CALCIUM SERPL-MCNC: 9.1 MG/DL — SIGNIFICANT CHANGE UP (ref 8.6–10.2)
CHLORIDE SERPL-SCNC: 94 MMOL/L — LOW (ref 98–107)
CO2 SERPL-SCNC: 33 MMOL/L — HIGH (ref 22–29)
COLOR SPEC: YELLOW — SIGNIFICANT CHANGE UP
CREAT SERPL-MCNC: 1.59 MG/DL — HIGH (ref 0.5–1.3)
DIFF PNL FLD: NEGATIVE — SIGNIFICANT CHANGE UP
EPI CELLS # UR: SIGNIFICANT CHANGE UP
GLUCOSE SERPL-MCNC: 189 MG/DL — HIGH (ref 70–115)
GLUCOSE UR QL: NEGATIVE MG/DL — SIGNIFICANT CHANGE UP
HCT VFR BLD CALC: 33.6 % — LOW (ref 42–52)
HGB BLD-MCNC: 10.7 G/DL — LOW (ref 14–18)
HYALINE CASTS # UR AUTO: ABNORMAL /LPF
KETONES UR-MCNC: NEGATIVE — SIGNIFICANT CHANGE UP
LEUKOCYTE ESTERASE UR-ACNC: ABNORMAL
MAGNESIUM SERPL-MCNC: 2 MG/DL — SIGNIFICANT CHANGE UP (ref 1.6–2.6)
MCHC RBC-ENTMCNC: 27.6 PG — SIGNIFICANT CHANGE UP (ref 27–31)
MCHC RBC-ENTMCNC: 31.8 G/DL — LOW (ref 32–36)
MCV RBC AUTO: 86.6 FL — SIGNIFICANT CHANGE UP (ref 80–94)
MRSA PCR RESULT.: SIGNIFICANT CHANGE UP
NITRITE UR-MCNC: NEGATIVE — SIGNIFICANT CHANGE UP
PH UR: 5 — SIGNIFICANT CHANGE UP (ref 5–8)
PHOSPHATE SERPL-MCNC: 4.7 MG/DL — SIGNIFICANT CHANGE UP (ref 2.4–4.7)
PLATELET # BLD AUTO: 188 K/UL — SIGNIFICANT CHANGE UP (ref 150–400)
POTASSIUM SERPL-MCNC: 4.3 MMOL/L — SIGNIFICANT CHANGE UP (ref 3.5–5.3)
POTASSIUM SERPL-SCNC: 4.3 MMOL/L — SIGNIFICANT CHANGE UP (ref 3.5–5.3)
PROT UR-MCNC: NEGATIVE MG/DL — SIGNIFICANT CHANGE UP
RBC # BLD: 3.88 M/UL — LOW (ref 4.6–6.2)
RBC # FLD: 15.8 % — HIGH (ref 11–15.6)
RBC CASTS # UR COMP ASSIST: SIGNIFICANT CHANGE UP /HPF (ref 0–4)
S AUREUS DNA NOSE QL NAA+PROBE: SIGNIFICANT CHANGE UP
SODIUM SERPL-SCNC: 135 MMOL/L — SIGNIFICANT CHANGE UP (ref 135–145)
SP GR SPEC: 1.01 — SIGNIFICANT CHANGE UP (ref 1.01–1.02)
UROBILINOGEN FLD QL: NEGATIVE MG/DL — SIGNIFICANT CHANGE UP
WBC # BLD: 6.9 K/UL — SIGNIFICANT CHANGE UP (ref 4.8–10.8)
WBC # FLD AUTO: 6.9 K/UL — SIGNIFICANT CHANGE UP (ref 4.8–10.8)
WBC UR QL: ABNORMAL

## 2017-06-16 PROCEDURE — 93320 DOPPLER ECHO COMPLETE: CPT | Mod: 26

## 2017-06-16 PROCEDURE — 99233 SBSQ HOSP IP/OBS HIGH 50: CPT

## 2017-06-16 PROCEDURE — 71010: CPT | Mod: 26

## 2017-06-16 PROCEDURE — 93325 DOPPLER ECHO COLOR FLOW MAPG: CPT | Mod: 26

## 2017-06-16 PROCEDURE — 99223 1ST HOSP IP/OBS HIGH 75: CPT

## 2017-06-16 PROCEDURE — 93312 ECHO TRANSESOPHAGEAL: CPT | Mod: 26

## 2017-06-16 PROCEDURE — 76376 3D RENDER W/INTRP POSTPROCES: CPT | Mod: 26

## 2017-06-16 RX ORDER — METOPROLOL TARTRATE 50 MG
25 TABLET ORAL
Qty: 0 | Refills: 0 | Status: DISCONTINUED | OUTPATIENT
Start: 2017-06-16 | End: 2017-06-18

## 2017-06-16 RX ORDER — SPIRONOLACTONE 25 MG/1
25 TABLET, FILM COATED ORAL
Qty: 0 | Refills: 0 | Status: DISCONTINUED | OUTPATIENT
Start: 2017-06-16 | End: 2017-06-18

## 2017-06-16 RX ORDER — SENNA PLUS 8.6 MG/1
2 TABLET ORAL AT BEDTIME
Qty: 0 | Refills: 0 | Status: DISCONTINUED | OUTPATIENT
Start: 2017-06-16 | End: 2017-06-18

## 2017-06-16 RX ADMIN — GABAPENTIN 400 MILLIGRAM(S): 400 CAPSULE ORAL at 11:58

## 2017-06-16 RX ADMIN — PANTOPRAZOLE SODIUM 40 MILLIGRAM(S): 20 TABLET, DELAYED RELEASE ORAL at 05:14

## 2017-06-16 RX ADMIN — ATORVASTATIN CALCIUM 80 MILLIGRAM(S): 80 TABLET, FILM COATED ORAL at 22:42

## 2017-06-16 RX ADMIN — Medication 100 MILLIGRAM(S): at 22:42

## 2017-06-16 RX ADMIN — SENNA PLUS 2 TABLET(S): 8.6 TABLET ORAL at 22:42

## 2017-06-16 RX ADMIN — SODIUM CHLORIDE 3 MILLILITER(S): 9 INJECTION INTRAMUSCULAR; INTRAVENOUS; SUBCUTANEOUS at 22:42

## 2017-06-16 RX ADMIN — Medication 100 MILLIGRAM(S): at 05:14

## 2017-06-16 RX ADMIN — GABAPENTIN 400 MILLIGRAM(S): 400 CAPSULE ORAL at 20:29

## 2017-06-16 RX ADMIN — HEPARIN SODIUM 5000 UNIT(S): 5000 INJECTION INTRAVENOUS; SUBCUTANEOUS at 22:42

## 2017-06-16 RX ADMIN — CITALOPRAM 40 MILLIGRAM(S): 10 TABLET, FILM COATED ORAL at 20:28

## 2017-06-16 RX ADMIN — GABAPENTIN 400 MILLIGRAM(S): 400 CAPSULE ORAL at 05:14

## 2017-06-16 RX ADMIN — Medication 325 MILLIGRAM(S): at 11:58

## 2017-06-16 RX ADMIN — HEPARIN SODIUM 5000 UNIT(S): 5000 INJECTION INTRAVENOUS; SUBCUTANEOUS at 14:18

## 2017-06-16 RX ADMIN — SODIUM CHLORIDE 3 MILLILITER(S): 9 INJECTION INTRAMUSCULAR; INTRAVENOUS; SUBCUTANEOUS at 06:09

## 2017-06-16 RX ADMIN — CLOPIDOGREL BISULFATE 75 MILLIGRAM(S): 75 TABLET, FILM COATED ORAL at 11:58

## 2017-06-16 RX ADMIN — HEPARIN SODIUM 5000 UNIT(S): 5000 INJECTION INTRAVENOUS; SUBCUTANEOUS at 05:14

## 2017-06-16 RX ADMIN — Medication 2 SPRAY(S): at 11:58

## 2017-06-16 RX ADMIN — TIOTROPIUM BROMIDE 1 CAPSULE(S): 18 CAPSULE ORAL; RESPIRATORY (INHALATION) at 09:10

## 2017-06-16 RX ADMIN — INSULIN GLARGINE 30 UNIT(S): 100 INJECTION, SOLUTION SUBCUTANEOUS at 22:43

## 2017-06-16 RX ADMIN — Medication 25 MILLIGRAM(S): at 20:29

## 2017-06-16 RX ADMIN — SODIUM CHLORIDE 3 MILLILITER(S): 9 INJECTION INTRAMUSCULAR; INTRAVENOUS; SUBCUTANEOUS at 14:17

## 2017-06-16 RX ADMIN — TAMSULOSIN HYDROCHLORIDE 0.4 MILLIGRAM(S): 0.4 CAPSULE ORAL at 22:42

## 2017-06-16 NOTE — CONSULT NOTE ADULT - PROBLEM SELECTOR RECOMMENDATION 9
Diastolic dysfunction due to Mitral regurgitation +/- Likely due to TMLR and chronic CAD and hypertension.   Recommend cardiomem insertion.  MOderate Mitral regurgitation on MARGARETH.    Mitral valve clip procedure can be done. Anatomy favourable.   heart valve team informed.   Ct diuresis for now. Recommend right heart cath and cardiomem before or after clip.   ct antianginal.

## 2017-06-16 NOTE — PROGRESS NOTE ADULT - PROBLEM SELECTOR PLAN 6
Creatinine slightly increased today from yesterday.  Trend BUN/creat.  Call renal consult today for input. (Josiah).  Renal diet.

## 2017-06-16 NOTE — PROGRESS NOTE ADULT - SUBJECTIVE AND OBJECTIVE BOX
PULMONARY PROGRESS NOTE      EMIGDIO BROOKSDAVID-[id number]    Patient is a 62y old  Male who presents with a chief complaint of [place] [facility] [facility] [place] (14 Jun 2017 18:55)      INTERVAL HPI/OVERNIGHT EVENTS:Feels OK without SOB cough wheeze.    MEDICATIONS  (STANDING):  sodium chloride 0.9% lock flush 3milliLiter(s) IV Push every 8 hours  aspirin 325milliGRAM(s) Oral daily  gabapentin 400milliGRAM(s) Oral four times a day  LORazepam     Tablet 2milliGRAM(s) Oral at bedtime  citalopram 40milliGRAM(s) Oral daily  traZODone 50milliGRAM(s) Oral at bedtime  traZODone 100milliGRAM(s) Oral at bedtime  atorvastatin 80milliGRAM(s) Oral at bedtime  clopidogrel Tablet 75milliGRAM(s) Oral daily  fluticasone propionate 50 MICROgram(s)/spray Nasal Spray 2Spray(s) Both Nostrils daily  pantoprazole    Tablet 40milliGRAM(s) Oral before breakfast  insulin lispro (HumaLOG) corrective regimen sliding scale  SubCutaneous three times a day before meals  dextrose 5%. 1000milliLiter(s) IV Continuous <Continuous>  dextrose 50% Injectable 12.5Gram(s) IV Push once  dextrose 50% Injectable 25Gram(s) IV Push once  dextrose 50% Injectable 25Gram(s) IV Push once  docusate sodium 100milliGRAM(s) Oral three times a day  heparin  Injectable 5000Unit(s) SubCutaneous every 8 hours  tamsulosin 0.4milliGRAM(s) Oral at bedtime  insulin lispro Injectable (HumaLOG) 5Unit(s) SubCutaneous three times a day before meals  tiotropium 18 MICROgram(s) Capsule 1Capsule(s) Inhalation daily  mometasone 220 MICROgram(s) Inhaler 1Puff(s) Inhalation daily  insulin glargine Injectable (LANTUS) 30Unit(s) SubCutaneous at bedtime  metoprolol 25milliGRAM(s) Oral two times a day  senna 2Tablet(s) Oral at bedtime      MEDICATIONS  (PRN):  dextrose Gel 1Dose(s) Oral once PRN Blood Glucose LESS THAN 70 milliGRAM(s)/deciLiter  glucagon  Injectable 1milliGRAM(s) IntraMuscular once PRN Glucose <70 milliGRAM(s)/deciLiter  acetaminophen   Tablet. 650milliGRAM(s) Oral every 6 hours PRN Mild Pain (1 - 3)      Allergies    No Known Allergies    Intolerances        PAST MEDICAL & SURGICAL HISTORY:  Spinal stenosis  Sleep apnea  Memory loss, short term  Hypoxia  Obesity  NSTEMI (non-ST elevated myocardial infarction)  Non-rheumatic mitral regurgitation  Ischemic cardiomyopathy  HLD (hyperlipidemia)  GERD (gastroesophageal reflux disease)  Diastolic dysfunction with acute on chronic heart failure  COPD (chronic obstructive pulmonary disease)  Charcot foot due to diabetes mellitus  CKD (chronic kidney disease) stage 2, GFR 60-89 ml/min  Hooper&#x27;s esophagus  PAD (peripheral artery disease)  CAD (coronary artery disease): multiple MI, PCI, CABG/TMR  Morbid obesity  Retinopathy  DM (diabetes mellitus)  Dyslipidemia  HTN (hypertension)  Coronary stent occlusion, subsequent encounter  History of transmyocardial revascularization  History of coronary artery bypass graft: TMR, 2001 [place]  History of amputation: 1/2 LLE great toe  1st and 2nd toes rt foot  History of wrist fracture: left repaired  History of cataract extraction      SOCIAL HISTORY  Smoking History:       REVIEW OF SYSTEMS:    CONSTITUTIONAL:  No distress    HEENT:  Eyes:  No diplopia or blurred vision. ENT:  No earache, sore throat or runny nose.    CARDIOVASCULAR:  No pressure, squeezing, tightness, heaviness or aching about the chest; no palpitations.    RESPIRATORY:  No cough, shortness of breath, PND or orthopnea.    GASTROINTESTINAL:  No nausea, vomiting or diarrhea.    GENITOURINARY:  No dysuria, frequency or urgency.    MUSCULOSKELETAL:  No joint pain    SKIN:  No new lesions.    NEUROLOGIC:  No paresthesias, fasciculations, seizures or weakness.    PSYCHIATRIC:  No disorder of thought or mood.    ENDOCRINE:  No heat or cold intolerance, polyuria or polydipsia.    HEMATOLOGICAL:  No easy bruising or bleeding.     Vital Signs Last 24 Hrs  T(C): 36.7, Max: 36.7 (06-15 @ 15:22)  T(F): 98.1, Max: 98.1 (06-16 @ 05:09)  HR: 56 (53 - 60)  BP: 112/52 (85/42 - 112/52)  BP(mean): --  RR: 20 (18 - 20)  SpO2: 96% (94% - 99%)    PHYSICAL EXAMINATION:    GENERAL: The patient is awake and alert in no apparent distress.     HEENT: Head is normocephalic and atraumatic. Extraocular muscles are intact. Mucous membranes are moist.    NECK: Supple.    LUNGS: Clear to auscultation without wheezing, rales or rhonchi; respirations unlabored    HEART: Regular rate and rhythm without murmur.    ABDOMEN: Soft, nontender, and nondistended.      EXTREMITIES: Without any cyanosis, clubbing, rash, lesions or edema.    NEUROLOGIC: Grossly intact.    SKIN: No ulceration or induration present.      LABS:                        10.7   6.9   )-----------( 188      ( 16 Jun 2017 06:51 )             33.6     06-16    135  |  94<L>  |  54.0<H>  ----------------------------<  189<H>  4.3   |  33.0<H>  |  1.59<H>    Ca    9.1      16 Jun 2017 06:51  Phos  4.7     06-16  Mg     2.0     06-16    TPro  6.6  /  Alb  3.5  /  TBili  0.4  /  DBili  x   /  AST  20  /  ALT  19  /  AlkPhos  75  06-15    PT/INR - ( 14 Jun 2017 20:44 )   PT: 11.4 sec;   INR: 1.04 ratio         PTT - ( 14 Jun 2017 20:44 )  PTT:37.5 sec    ABG - ( 15 Adarsh 2017 17:16 )  pH: 7.40  /  pCO2: 50    /  pO2: 53    / HCO3: 29    / Base Excess: 5.2   /  SaO2: 86                      Serum Pro-Brain Natriuretic Peptide: 678 pg/mL (06-14 @ 20:44)          MICROBIOLOGY:    RADIOLOGY & ADDITIONAL STUDIES:   EXAM:  CHEST SINGLE VIEW FRONTAL                          PROCEDURE DATE:  06/16/2017        INTERPRETATION:  CHEST AP PORTABLE:    History: sob.     Date and time of exam: 6/16/2017 4:13 AM.    Technique: A single AP view of the chest was obtained.    Comparison exam: 6/14/2017 9:58 PM.    Findings:  Persistent cardiomegaly. The lungs are clear. Mild pulmonary vascular   congestion, unchanged. No evidence of pleural effusion or pneumothorax..    Impression:  Mild pulmonary vascular congestion. Cardiomegaly. Stable exam without   significant change since the previous study..                SINGH RAZO M.D., ATTENDING RADIOLOGIST  This document has been electronically signed. Jun 16 2017  9:26AM

## 2017-06-16 NOTE — PROGRESS NOTE ADULT - SUBJECTIVE AND OBJECTIVE BOX
Subjective: "Do you know when there will be a plan regarding my surgery?"  Lying in bed.  Denies SOB or CP.  NAD noted.      Tele: SB                      T(F): 98.1, Max: 98.1 (06-16 @ 05:09)  HR: 56 (53 - 60)  BP: 112/52 (85/42 - 112/52)  RR: 20 (18 - 20)  SpO2: 96% (94% - 99%) on 3 liters nasal O2.  Desats overnight to 80%.      LV EF: 60%    No Known Allergies      06-16    135  |  94<L>  |  54.0<H>  ----------------------------<  189<H>  4.3   |  33.0<H>  |  1.59<H>    Ca    9.1      16 Jun 2017 06:51  Phos  4.7     06-16  Mg     2.0     06-16    TPro  6.6  /  Alb  3.5  /  TBili  0.4  /  DBili  x   /  AST  20  /  ALT  19  /  AlkPhos  75  06-15                               10.7   6.9   )-----------( 188      ( 16 Jun 2017 06:51 )             33.6        PT/INR - ( 14 Jun 2017 20:44 )   PT: 11.4 sec;   INR: 1.04 ratio      Blood Gas Profile - Arterial (06.15.17 @ 17:16)    pH, Arterial: 7.40    pCO2, Arterial: 50 mmHg    pO2, Arterial: 53 mmHg    HCO3, Arterial: 29 mmoL/L    Base Excess, Arterial: 5.2 mmol/L    Oxygen Saturation, Arterial: 86 %    FIO2, Arterial: 21%    Blood Gas Comments Arterial: Room Air    Blood Gas Source Arterial: Arterial     PTT - ( 14 Jun 2017 20:44 )  PTT:37.5 sec       CAPILLARY BLOOD GLUCOSE  162 (16 Jun 2017 07:45)  145 (15 Adarsh 2017 21:30)  162 (15 Adarsh 2017 16:55)  251 (15 Adarsh 2017 13:49)           CXR:   Findings:  Persistent cardiomegaly. The lungs are clear. Mild pulmonary vascular   congestion, unchanged. No evidence of pleural effusion or pneumothorax..    Impression:  Mild pulmonary vascular congestion. Cardiomegaly. Stable exam without   significant change since the previous study.      SINGH RAZO M.D., ATTENDING RADIOLOGIST  This document has been electronically signed. Jun 16 2017  9:26AM    I&O's Detail    I & Os for current day (as of 16 Jun 2017 09:54)  =============================================  IN:    Oral Fluid: 680 ml    Total IN: 680 ml  ---------------------------------------------  OUT:    Voided: 1200 ml    Total OUT: 1200 ml  ---------------------------------------------  Total NET: -520 ml      Active Medications:  sodium chloride 0.9% lock flush 3milliLiter(s) IV Push every 8 hours  aspirin 325milliGRAM(s) Oral daily  gabapentin 400milliGRAM(s) Oral four times a day  LORazepam     Tablet 2milliGRAM(s) Oral at bedtime  citalopram 40milliGRAM(s) Oral daily  traZODone 50milliGRAM(s) Oral at bedtime  traZODone 100milliGRAM(s) Oral at bedtime  atorvastatin 80milliGRAM(s) Oral at bedtime  clopidogrel Tablet 75milliGRAM(s) Oral daily  fluticasone propionate 50 MICROgram(s)/spray Nasal Spray 2Spray(s) Both Nostrils daily  pantoprazole    Tablet 40milliGRAM(s) Oral before breakfast  insulin lispro (HumaLOG) corrective regimen sliding scale  SubCutaneous three times a day before meals  dextrose 5%. 1000milliLiter(s) IV Continuous <Continuous>  dextrose Gel 1Dose(s) Oral once PRN  dextrose 50% Injectable 12.5Gram(s) IV Push once  dextrose 50% Injectable 25Gram(s) IV Push once  dextrose 50% Injectable 25Gram(s) IV Push once  glucagon  Injectable 1milliGRAM(s) IntraMuscular once PRN  docusate sodium 100milliGRAM(s) Oral three times a day  acetaminophen   Tablet. 650milliGRAM(s) Oral every 6 hours PRN  heparin  Injectable 5000Unit(s) SubCutaneous every 8 hours  tamsulosin 0.4milliGRAM(s) Oral at bedtime  insulin lispro Injectable (HumaLOG) 5Unit(s) SubCutaneous three times a day before meals  tiotropium 18 MICROgram(s) Capsule 1Capsule(s) Inhalation daily  mometasone 220 MICROgram(s) Inhaler 1Puff(s) Inhalation daily  insulin glargine Injectable (LANTUS) 30Unit(s) SubCutaneous at bedtime  metoprolol 25milliGRAM(s) Oral two times a day  senna 2Tablet(s) Oral at bedtime      Physical Exam:    Neuro: AAOX3.  No focal deficits.    Pulm: Diminished BLL.    CV: RRR. +S1+S2.  +systolic murmer.    Abd: Soft/NT/ND.  +BS.  +BM 6/14 per pt.    Extremities: Trace edema BLE.  +pp.       PAST MEDICAL & SURGICAL HISTORY:  Spinal stenosis  Sleep apnea  Memory loss, short term  Hypoxia  Obesity  NSTEMI (non-ST elevated myocardial infarction)  Non-rheumatic mitral regurgitation  Ischemic cardiomyopathy  HLD (hyperlipidemia)  GERD (gastroesophageal reflux disease)  Diastolic dysfunction with acute on chronic heart failure  COPD (chronic obstructive pulmonary disease)  Charcot foot due to diabetes mellitus  CKD (chronic kidney disease) stage 2, GFR 60-89 ml/min  Hooper&#x27;s esophagus  PAD (peripheral artery disease)  CAD (coronary artery disease): multiple MI, PCI, CABG/TMR  Morbid obesity  Retinopathy  DM (diabetes mellitus)  Dyslipidemia  HTN (hypertension)  Coronary stent occlusion, subsequent encounter  History of transmyocardial revascularization  History of coronary artery bypass graft: TMR, 2001 [place]  History of amputation: 1/2 LLE great toe  1st and 2nd toes rt foot  History of wrist fracture: left repaired  History of cataract extraction Subjective: "Do you know when there will be a plan regarding my surgery?"  Lying in bed.  Denies SOB or CP.  NAD noted.      Tele: SB                      T(F): 98.1, Max: 98.1 (06-16 @ 05:09)  HR: 56 (53 - 60)  BP: 112/52 (85/42 - 112/52)  RR: 20 (18 - 20)  SpO2: 96% (94% - 99%) on 3 liters nasal O2.  Desats overnight to 80%.      LV EF: 60%    No Known Allergies      06-16    135  |  94<L>  |  54.0<H>  ----------------------------<  189<H>  4.3   |  33.0<H>  |  1.59<H>    Ca    9.1      16 Jun 2017 06:51  Phos  4.7     06-16  Mg     2.0     06-16    TPro  6.6  /  Alb  3.5  /  TBili  0.4  /  DBili  x   /  AST  20  /  ALT  19  /  AlkPhos  75  06-15                               10.7   6.9   )-----------( 188      ( 16 Jun 2017 06:51 )             33.6        PT/INR - ( 14 Jun 2017 20:44 )   PT: 11.4 sec;   INR: 1.04 ratio      Blood Gas Profile - Arterial (06.15.17 @ 17:16)    pH, Arterial: 7.40    pCO2, Arterial: 50 mmHg    pO2, Arterial: 53 mmHg    HCO3, Arterial: 29 mmoL/L    Base Excess, Arterial: 5.2 mmol/L    Oxygen Saturation, Arterial: 86 %    FIO2, Arterial: 21%    Blood Gas Comments Arterial: Room Air    Blood Gas Source Arterial: Arterial     PTT - ( 14 Jun 2017 20:44 )  PTT:37.5 sec       CAPILLARY BLOOD GLUCOSE  162 (16 Jun 2017 07:45)  145 (15 Adarsh 2017 21:30)  162 (15 Adarsh 2017 16:55)  251 (15 Adarsh 2017 13:49)           CXR:   Findings:  Persistent cardiomegaly. The lungs are clear. Mild pulmonary vascular   congestion, unchanged. No evidence of pleural effusion or pneumothorax..    Impression:  Mild pulmonary vascular congestion. Cardiomegaly. Stable exam without   significant change since the previous study.      SINGH RAZO M.D., ATTENDING RADIOLOGIST  This document has been electronically signed. Jun 16 2017  9:26AM    TTE:    Summary:   1. There is mild concentric left ventricular hypertrophy.   2. Normal global left ventricular systolic function.   3. Left ventricular ejection fraction, by visual estimation, is 60%.   4. Basal inferior segment is abnormal as described above.   5. Spectral Doppler shows pseudonormal pattern of left ventricular   myocardial filling (Grade II diastolic dysfunction).   6. Normal right ventricular size and function.   7. The left atrium is normal in size.   8. The right atrium is normal in size.   9. Sclerotic aortic valve with normal opening.  10. Severe anteriorly directed mitral valve regurgitation. Posterior   leaflet is mildly restricted with inferior wall hypokinesis suggestive of   ischemic regurgitation.  11. Estimated pulmonary artery systolic pressure is 39.4 mmHg assuming a   right atrial pressure of 8 mmHg, which is consistent with borderline   pulmonary hypertension.  12. Mildly dilated pulmonary artery.  13. There is no evidence of pericardial effusion.     MD Madison Electronically signed on 6/15/2017 at 5:06:43 PM    I&O's Detail    I & Os for current day (as of 16 Jun 2017 09:54)  =============================================  IN:    Oral Fluid: 680 ml    Total IN: 680 ml  ---------------------------------------------  OUT:    Voided: 1200 ml    Total OUT: 1200 ml  ---------------------------------------------  Total NET: -520 ml      Active Medications:  sodium chloride 0.9% lock flush 3milliLiter(s) IV Push every 8 hours  aspirin 325milliGRAM(s) Oral daily  gabapentin 400milliGRAM(s) Oral four times a day  LORazepam     Tablet 2milliGRAM(s) Oral at bedtime  citalopram 40milliGRAM(s) Oral daily  traZODone 50milliGRAM(s) Oral at bedtime  traZODone 100milliGRAM(s) Oral at bedtime  atorvastatin 80milliGRAM(s) Oral at bedtime  clopidogrel Tablet 75milliGRAM(s) Oral daily  fluticasone propionate 50 MICROgram(s)/spray Nasal Spray 2Spray(s) Both Nostrils daily  pantoprazole    Tablet 40milliGRAM(s) Oral before breakfast  insulin lispro (HumaLOG) corrective regimen sliding scale  SubCutaneous three times a day before meals  dextrose 5%. 1000milliLiter(s) IV Continuous <Continuous>  dextrose Gel 1Dose(s) Oral once PRN  dextrose 50% Injectable 12.5Gram(s) IV Push once  dextrose 50% Injectable 25Gram(s) IV Push once  dextrose 50% Injectable 25Gram(s) IV Push once  glucagon  Injectable 1milliGRAM(s) IntraMuscular once PRN  docusate sodium 100milliGRAM(s) Oral three times a day  acetaminophen   Tablet. 650milliGRAM(s) Oral every 6 hours PRN  heparin  Injectable 5000Unit(s) SubCutaneous every 8 hours  tamsulosin 0.4milliGRAM(s) Oral at bedtime  insulin lispro Injectable (HumaLOG) 5Unit(s) SubCutaneous three times a day before meals  tiotropium 18 MICROgram(s) Capsule 1Capsule(s) Inhalation daily  mometasone 220 MICROgram(s) Inhaler 1Puff(s) Inhalation daily  insulin glargine Injectable (LANTUS) 30Unit(s) SubCutaneous at bedtime  metoprolol 25milliGRAM(s) Oral two times a day  senna 2Tablet(s) Oral at bedtime      Physical Exam:    Neuro: AAOX3.  No focal deficits.    Pulm: Diminished BLL.    CV: RRR. +S1+S2.  +systolic murmer.    Abd: Soft/NT/ND.  +BS.  +BM 6/14 per pt.    Extremities: Trace edema BLE.  +pp.       PAST MEDICAL & SURGICAL HISTORY:  Spinal stenosis  Sleep apnea  Memory loss, short term  Hypoxia  Obesity  NSTEMI (non-ST elevated myocardial infarction)  Non-rheumatic mitral regurgitation  Ischemic cardiomyopathy  HLD (hyperlipidemia)  GERD (gastroesophageal reflux disease)  Diastolic dysfunction with acute on chronic heart failure  COPD (chronic obstructive pulmonary disease)  Charcot foot due to diabetes mellitus  CKD (chronic kidney disease) stage 2, GFR 60-89 ml/min  Hooper&#x27;s esophagus  PAD (peripheral artery disease)  CAD (coronary artery disease): multiple MI, PCI, CABG/TMR  Morbid obesity  Retinopathy  DM (diabetes mellitus)  Dyslipidemia  HTN (hypertension)  Coronary stent occlusion, subsequent encounter  History of transmyocardial revascularization  History of coronary artery bypass graft: TMR, 2001 [place]  History of amputation: 1/2 LLE great toe  1st and 2nd toes rt foot  History of wrist fracture: left repaired  History of cataract extraction

## 2017-06-16 NOTE — CONSULT NOTE ADULT - SUBJECTIVE AND OBJECTIVE BOX
[facility]                                                        Office: [address] [address]                                                       Telephone: [phone]. Fax:[phone]                                                              CARDIOLOGY CONSULTATION NOTE                                                                                             Consult requested by:  Eric Faulkner MD (CT surgery_    Reason for Consultation: mitral regurgitation    History obtained by: Patient and medical record     obtained: No    Chief complaint:    Patient is a 62y old  Male who presents with a chief complaint of Transfer from [facility] x 2 Mths (2017 18:55)      HPI:  This is a 61 y/o M with h/o known diastolic heart failure moderate MR ( ECHO in May) had a recent admission at [facility],  discharged on  and readmitted on  with CHF. Pt has a h/o of Dm ( on Insulin) multiple PCI (on plavix) HTN, HLD, depression, CKD, PAD with right femoral stent,, GERD, CAD s/p CABG , and > 40 stents, TMR X 2,  Neuropathy, Charcot foot,  STML & balance issues  for which he sees a neurologist,  Home O2 the last 2 months for CHF ( Dr Gong placed pt on).  Pt was seen by Dr Gong in the office and advised pt to go to [facility] for Acute Decompensated  diastolic Heart Failure.  Pt states he feels better now being that hes been on lasix since admission and noticed that his LE edema has gone down.  Denies CP, HA, N & V, dizziness (2017 18:55)  patient had 2-3 episodes of lower extremity edema worsening heart failure.   last month he had chest pain too and got a recent PCI. chest pain resolved. but dyspnea persist.         REVIEW OF SYMPTOMS: Cardiovascular:  See HPI. No chest pain,   (+) dyspnea,  No syncope,  No palpitations, No dizziness, + Orthopnea,      +  Paroxsymal nocturnal dyspnea;  Respiratory:  +  Dyspnea, No cough,     Genitourinary:  No dysuria, no hematuria; Gastrointestinal:  No nausea, no vomiting. No diarrhea.  No abdominal pain. No dark color stool, no melena ; Neurological: No headache, no dizziness, no slurred speech;  Psychiatric: No agitation, no anxiety.  ALL OTHER REVIEW OF SYSTEMS ARE NEGATIVE.    ALLERGIES: No Known Allergies    Intolerances-NONE     CURRENT MEDICATIONS:  tamsulosin 0.4milliGRAM(s) Oral at bedtime  metoprolol 25milliGRAM(s) Oral two times a day    tiotropium 18 MICROgram(s) Capsule  mometasone 220 MICROgram(s) Inhaler   aspirin  gabapentin  LORazepam     Tablet  citalopram  traZODone  traZODone  pantoprazole    Tablet  docusate sodium  senna  atorvastatin  clopidogrel Tablet  fluticasone propionate 50 MICROgram(s)/spray Nasal Spray  insulin lispro (HumaLOG) corrective regimen sliding scale  dextrose 5%.  dextrose 50% Injectable  dextrose 50% Injectable  dextrose 50% Injectable  heparin  Injectable  insulin lispro Injectable (HumaLOG)  insulin glargine Injectable (LANTUS)      HOME MEDICATIONS:    PAST MEDICAL HISTORY  Spinal stenosis  Sleep apnea  Memory loss, short term  Hypoxia  Obesity  NSTEMI (non-ST elevated myocardial infarction)  Non-rheumatic mitral regurgitation  Ischemic cardiomyopathy  HLD (hyperlipidemia)  GERD (gastroesophageal reflux disease)  Diastolic dysfunction with acute on chronic heart failure  COPD (chronic obstructive pulmonary disease)  Charcot foot due to diabetes mellitus  CKD (chronic kidney disease) stage 2, GFR 60-89 ml/min  Hooper&#x27;s esophagus  PAD (peripheral artery disease)  CAD (coronary artery disease)  Morbid obesity  Retinopathy  DM (diabetes mellitus)  Dyslipidemia  HTN (hypertension)      PAST SURGICAL HISTORY  Coronary stent occlusion, subsequent encounter  History of transmyocardial revascularization  History of coronary artery bypass graft  History of amputation  History of wrist fracture  History of cataract extraction      FAMILY HISTORY:  Family history of diabetes mellitus (DM) (Mother): Mother  Family history of CABG (Sibling): sister      SOCIAL HISTORY:  Denies smoking/alcohol/drugs  Vital Signs Last 24 Hrs  T(C): 36.5, Max: 36.7 (06-15 @ 21:30)  T(F): 97.7, Max: 98.1 (06-16 @ 05:09)  HR: 64 (56 - 64)  BP: 108/52 (85/42 - 112/52)  BP(mean): --  RR: 16 (16 - 20)  SpO2: 98% (94% - 98%)      PHYSICAL EXAM:  Constitutional: Comfortable . No acute distress.   HEENT: Atraumatic and normcephalic , neck is supple . no JVD. No carotid bruit. PEERL   CNS: A&Ox3. No focal deficits. EOMI. Cranial nerves II-IX are intact.   Lymph Nodes: Cervical : Not palpable.  Respiratory: decrease breath sounds in the bases. mild crepts in the base.  Cardiovascular: S1S2 RRR. Distant heart sounds. very faint systolic murmur.  rubs or gallop.  Gastrointestinal: Soft non-tender and non distended . +Bowel sounds. negative Garcia's sign.  Extremities: No edema.   Psychiatric: Calm . no agitation.  Skin: No skin rash/ulcers visualized to face, hands or feet.    Intake and output: I & Os for 24h ending  @ 07:00  =============================================  IN: 680 ml / OUT: 1200 ml / NET: -520 ml    I & Os for current day (as of  @ 18:08)  =============================================  IN: 60 ml / OUT: 850 ml / NET: -790 ml      LABS:                        10.7   6.9   )-----------( 188      ( 2017 06:51 )             33.6     06-16    135  |  94<L>  |  54.0<H>  ----------------------------<  189<H>  4.3   |  33.0<H>  |  1.59<H>    Ca    9.1      2017 06:51  Phos  4.7     -16  Mg     2.0     -16    TPro  6.6  /  Alb  3.5  /  TBili  0.4  /  DBili  x   /  AST  20  /  ALT  19  /  AlkPhos  75  06-15      ;p-BNP=Serum Pro-Brain Natriuretic Peptide: 678 pg/mL ( @ 20:44)    PT/INR - ( 2017 20:44 )   PT: 11.4 sec;   INR: 1.04 ratio         PTT - ( 2017 20:44 )  PTT:37.5 sec  Urinalysis Basic - ( 2017 15:42 )    Color: Yellow / Appearance: Clear / S.015 / pH: x  Gluc: x / Ketone: Negative  / Bili: Negative / Urobili: Negative mg/dL   Blood: x / Protein: Negative mg/dL / Nitrite: Negative   Leuk Esterase: Trace / RBC: 0-2 /HPF / WBC 6-10   Sq Epi: x / Non Sq Epi: Occasional / Bacteria: Occasional        ECG: Reviewed by me. Normal sinus rhythm Minimal voltage criteria for LVH, may be normal variant  Nonspecific T wave abnormality  Prolonged QT    RADIOLOGY & ADDITIONAL STUDIES:    X-ray:  reviewed by me. 2017  Mild pulmonary vascular congestion. Cardiomegaly. Stable exam without   significant change since the previous study..     ECHO FINDINGS: Date: 6/15/2017: EF 60%. Grade II diastolic dysfunction. Severe MR w. mild LVH./ Normal RV.     MARGARETH: 2017: EF normal. MOderate MR. Grade III diastolic dysfunction. No effusion. Mild TR. Mild PAH.

## 2017-06-16 NOTE — PROGRESS NOTE ADULT - SUBJECTIVE AND OBJECTIVE BOX
Renal :    63 YO - M .    w. Severe  MR , DM , HTN & CKD -3    CAD ( S/P CABG , coronary stents  )  Diastolic CHF    EMR Reviewed,     Full consult to Follow,

## 2017-06-16 NOTE — CONSULT NOTE ADULT - ASSESSMENT
62 M with multiple comorbidities, CAD s/p CABG 2001, with PCI multiple stents, and TMLR x 2, (transmyocardial laser revascularization), DM , diastolic heart failure with recurrent heart failure admissions.

## 2017-06-17 DIAGNOSIS — I34.0 NONRHEUMATIC MITRAL (VALVE) INSUFFICIENCY: ICD-10-CM

## 2017-06-17 LAB
ALBUMIN SERPL ELPH-MCNC: 3.5 G/DL — SIGNIFICANT CHANGE UP (ref 3.3–5.2)
ALP SERPL-CCNC: 72 U/L — SIGNIFICANT CHANGE UP (ref 40–120)
ALT FLD-CCNC: 20 U/L — SIGNIFICANT CHANGE UP
ANION GAP SERPL CALC-SCNC: 12 MMOL/L — SIGNIFICANT CHANGE UP (ref 5–17)
AST SERPL-CCNC: 21 U/L — SIGNIFICANT CHANGE UP
BILIRUB SERPL-MCNC: 0.5 MG/DL — SIGNIFICANT CHANGE UP (ref 0.4–2)
BLD GP AB SCN SERPL QL: SIGNIFICANT CHANGE UP
BUN SERPL-MCNC: 47 MG/DL — HIGH (ref 8–20)
CALCIUM SERPL-MCNC: 9.1 MG/DL — SIGNIFICANT CHANGE UP (ref 8.6–10.2)
CHLORIDE SERPL-SCNC: 98 MMOL/L — SIGNIFICANT CHANGE UP (ref 98–107)
CO2 SERPL-SCNC: 28 MMOL/L — SIGNIFICANT CHANGE UP (ref 22–29)
CREAT SERPL-MCNC: 1.55 MG/DL — HIGH (ref 0.5–1.3)
GLUCOSE SERPL-MCNC: 94 MG/DL — SIGNIFICANT CHANGE UP (ref 70–115)
HCT VFR BLD CALC: 35.8 % — LOW (ref 42–52)
HGB BLD-MCNC: 11.3 G/DL — LOW (ref 14–18)
MAGNESIUM SERPL-MCNC: 2.1 MG/DL — SIGNIFICANT CHANGE UP (ref 1.6–2.6)
MCHC RBC-ENTMCNC: 27.4 PG — SIGNIFICANT CHANGE UP (ref 27–31)
MCHC RBC-ENTMCNC: 31.6 G/DL — LOW (ref 32–36)
MCV RBC AUTO: 86.9 FL — SIGNIFICANT CHANGE UP (ref 80–94)
PHOSPHATE SERPL-MCNC: 4.2 MG/DL — SIGNIFICANT CHANGE UP (ref 2.4–4.7)
PLATELET # BLD AUTO: 179 K/UL — SIGNIFICANT CHANGE UP (ref 150–400)
POTASSIUM SERPL-MCNC: 4.6 MMOL/L — SIGNIFICANT CHANGE UP (ref 3.5–5.3)
POTASSIUM SERPL-SCNC: 4.6 MMOL/L — SIGNIFICANT CHANGE UP (ref 3.5–5.3)
PROT SERPL-MCNC: 6.6 G/DL — SIGNIFICANT CHANGE UP (ref 6.6–8.7)
RBC # BLD: 4.12 M/UL — LOW (ref 4.6–6.2)
RBC # FLD: 16.1 % — HIGH (ref 11–15.6)
SODIUM SERPL-SCNC: 138 MMOL/L — SIGNIFICANT CHANGE UP (ref 135–145)
TYPE + AB SCN PNL BLD: SIGNIFICANT CHANGE UP
WBC # BLD: 6.3 K/UL — SIGNIFICANT CHANGE UP (ref 4.8–10.8)
WBC # FLD AUTO: 6.3 K/UL — SIGNIFICANT CHANGE UP (ref 4.8–10.8)

## 2017-06-17 PROCEDURE — 71010: CPT | Mod: 26

## 2017-06-17 PROCEDURE — 99233 SBSQ HOSP IP/OBS HIGH 50: CPT

## 2017-06-17 PROCEDURE — 93880 EXTRACRANIAL BILAT STUDY: CPT | Mod: 26

## 2017-06-17 PROCEDURE — 93010 ELECTROCARDIOGRAM REPORT: CPT

## 2017-06-17 RX ADMIN — Medication 325 MILLIGRAM(S): at 12:16

## 2017-06-17 RX ADMIN — Medication 2 MILLIGRAM(S): at 00:30

## 2017-06-17 RX ADMIN — Medication 5 UNIT(S): at 17:20

## 2017-06-17 RX ADMIN — Medication 50 MILLIGRAM(S): at 00:30

## 2017-06-17 RX ADMIN — SODIUM CHLORIDE 3 MILLILITER(S): 9 INJECTION INTRAMUSCULAR; INTRAVENOUS; SUBCUTANEOUS at 21:23

## 2017-06-17 RX ADMIN — SENNA PLUS 2 TABLET(S): 8.6 TABLET ORAL at 21:22

## 2017-06-17 RX ADMIN — Medication 25 MILLIGRAM(S): at 05:20

## 2017-06-17 RX ADMIN — CLOPIDOGREL BISULFATE 75 MILLIGRAM(S): 75 TABLET, FILM COATED ORAL at 12:16

## 2017-06-17 RX ADMIN — SPIRONOLACTONE 25 MILLIGRAM(S): 25 TABLET, FILM COATED ORAL at 17:20

## 2017-06-17 RX ADMIN — SPIRONOLACTONE 25 MILLIGRAM(S): 25 TABLET, FILM COATED ORAL at 05:21

## 2017-06-17 RX ADMIN — Medication 5 UNIT(S): at 12:16

## 2017-06-17 RX ADMIN — ATORVASTATIN CALCIUM 80 MILLIGRAM(S): 80 TABLET, FILM COATED ORAL at 21:16

## 2017-06-17 RX ADMIN — TAMSULOSIN HYDROCHLORIDE 0.4 MILLIGRAM(S): 0.4 CAPSULE ORAL at 21:16

## 2017-06-17 RX ADMIN — HEPARIN SODIUM 5000 UNIT(S): 5000 INJECTION INTRAVENOUS; SUBCUTANEOUS at 21:17

## 2017-06-17 RX ADMIN — INSULIN GLARGINE 30 UNIT(S): 100 INJECTION, SOLUTION SUBCUTANEOUS at 22:46

## 2017-06-17 RX ADMIN — HEPARIN SODIUM 5000 UNIT(S): 5000 INJECTION INTRAVENOUS; SUBCUTANEOUS at 05:21

## 2017-06-17 RX ADMIN — Medication 100 MILLIGRAM(S): at 21:16

## 2017-06-17 RX ADMIN — Medication 25 MILLIGRAM(S): at 17:20

## 2017-06-17 RX ADMIN — GABAPENTIN 400 MILLIGRAM(S): 400 CAPSULE ORAL at 17:20

## 2017-06-17 RX ADMIN — SODIUM CHLORIDE 3 MILLILITER(S): 9 INJECTION INTRAMUSCULAR; INTRAVENOUS; SUBCUTANEOUS at 14:53

## 2017-06-17 RX ADMIN — TIOTROPIUM BROMIDE 1 CAPSULE(S): 18 CAPSULE ORAL; RESPIRATORY (INHALATION) at 08:40

## 2017-06-17 RX ADMIN — Medication 5 UNIT(S): at 09:48

## 2017-06-17 RX ADMIN — Medication 100 MILLIGRAM(S): at 05:20

## 2017-06-17 RX ADMIN — Medication 40 MILLIGRAM(S): at 12:16

## 2017-06-17 RX ADMIN — SODIUM CHLORIDE 3 MILLILITER(S): 9 INJECTION INTRAMUSCULAR; INTRAVENOUS; SUBCUTANEOUS at 05:18

## 2017-06-17 RX ADMIN — Medication 100 MILLIGRAM(S): at 14:52

## 2017-06-17 RX ADMIN — CITALOPRAM 40 MILLIGRAM(S): 10 TABLET, FILM COATED ORAL at 12:16

## 2017-06-17 RX ADMIN — GABAPENTIN 400 MILLIGRAM(S): 400 CAPSULE ORAL at 05:20

## 2017-06-17 RX ADMIN — HEPARIN SODIUM 5000 UNIT(S): 5000 INJECTION INTRAVENOUS; SUBCUTANEOUS at 14:52

## 2017-06-17 RX ADMIN — GABAPENTIN 400 MILLIGRAM(S): 400 CAPSULE ORAL at 12:16

## 2017-06-17 RX ADMIN — Medication 100 MILLIGRAM(S): at 00:30

## 2017-06-17 RX ADMIN — PANTOPRAZOLE SODIUM 40 MILLIGRAM(S): 20 TABLET, DELAYED RELEASE ORAL at 05:20

## 2017-06-17 RX ADMIN — Medication 2: at 12:16

## 2017-06-17 NOTE — PROGRESS NOTE ADULT - SUBJECTIVE AND OBJECTIVE BOX
Subjective: "I feel ok.  Do you know when I'll go home?"  Sitting up in bed.  Denies SOB or CP.  NAD noted.      Tele:  SR                        T(F): 97.7, Max: 97.7 (06-16 @ 17:30)  HR: 61 (61 - 70)  BP: 103/54 (103/54 - 115/63)  RR: 15 (15 - 17)  SpO2: 96% (96% - 99%) on 2 liters nasal O2      LV EF: 60%    No Known Allergies      06-17    138  |  98  |  47.0<H>  ----------------------------<  94  4.6   |  28.0  |  1.55<H>    Ca    9.1      17 Jun 2017 05:01  Phos  4.2     06-17  Mg     2.1     06-17    TPro  6.6  /  Alb  3.5  /  TBili  0.5  /  DBili  x   /  AST  21  /  ALT  20  /  AlkPhos  72  06-17                               11.3   6.3   )-----------( 179      ( 17 Jun 2017 05:01 )             35.8               CAPILLARY BLOOD GLUCOSE  84 (17 Jun 2017 08:06)  250 (16 Jun 2017 22:39)  137 (16 Jun 2017 17:30)  133 (16 Jun 2017 12:28)           CXR:   Findings:  Cardiomegaly. Pulmonary vascular congestion. No evidence of focal   parenchymal infiltrate. Post sternotomy changes. Small right pleural   effusion. No evidence of a left pleural effusion.    Impression:  Evidence of congestive heart failure.    SINGH RAZO M.D., ATTENDING RADIOLOGIST  This document has been electronically signed. Jun 17 2017  8:27AM    MARGARETH:    Summary:   1. No cardiac mass, vegetations, thrombus or shunts visualized.   2. Mildly enlarged left atrium.   3. No left atrial or left atrial appendage thrombus visualized.   Prominent left atrial appendage and normal left atrial appendage   velocities. No PFO.   4. Segmental wall motion abnormality in RCA territory.   5. Left ventricular ejection fraction, by visual estimation, is 55 to   60%. Grade III diastolic dysfunction.   6. Elevated left atrial and left ventricular end-diastolic pressures.   (LAP = 22 mm Hg)   7. Mildly dilated right atrium.   8. Normal right ventricular size and function.   9. Nicole Type III B regurgitation. Restricted posterior leaflet   with mild tethering. Moderate mitral regurgitation between A2 andP2   (medially). A small < mild jet laterally. Mean gradient = 1 mm Hg; MVA by   3D Planimetry > 4 cmsq.  10. Estimated pulmonary artery systolic pressure is 41.7 mmHg-mild   pulmonary hypertension.  11. There is no evidence of pericardial effusion.     MD Lynda Electronically signed on 6/17/2017 at 4:35:33 AM        I&O's Detail    I & Os for current day (as of 17 Jun 2017 09:09)  =============================================  IN:    Oral Fluid: 600 ml    Total IN: 600 ml  ---------------------------------------------  OUT:    Voided: 1700 ml    Total OUT: 1700 ml  ---------------------------------------------  Total NET: -1100 ml            Active Medications:  sodium chloride 0.9% lock flush 3milliLiter(s) IV Push every 8 hours  aspirin 325milliGRAM(s) Oral daily  gabapentin 400milliGRAM(s) Oral four times a day  LORazepam     Tablet 2milliGRAM(s) Oral at bedtime  citalopram 40milliGRAM(s) Oral daily  traZODone 50milliGRAM(s) Oral at bedtime  traZODone 100milliGRAM(s) Oral at bedtime  atorvastatin 80milliGRAM(s) Oral at bedtime  clopidogrel Tablet 75milliGRAM(s) Oral daily  fluticasone propionate 50 MICROgram(s)/spray Nasal Spray 2Spray(s) Both Nostrils daily  pantoprazole    Tablet 40milliGRAM(s) Oral before breakfast  insulin lispro (HumaLOG) corrective regimen sliding scale  SubCutaneous three times a day before meals  dextrose 5%. 1000milliLiter(s) IV Continuous <Continuous>  dextrose Gel 1Dose(s) Oral once PRN  dextrose 50% Injectable 12.5Gram(s) IV Push once  dextrose 50% Injectable 25Gram(s) IV Push once  dextrose 50% Injectable 25Gram(s) IV Push once  glucagon  Injectable 1milliGRAM(s) IntraMuscular once PRN  docusate sodium 100milliGRAM(s) Oral three times a day  acetaminophen   Tablet. 650milliGRAM(s) Oral every 6 hours PRN  heparin  Injectable 5000Unit(s) SubCutaneous every 8 hours  tamsulosin 0.4milliGRAM(s) Oral at bedtime  insulin lispro Injectable (HumaLOG) 5Unit(s) SubCutaneous three times a day before meals  tiotropium 18 MICROgram(s) Capsule 1Capsule(s) Inhalation daily  mometasone 220 MICROgram(s) Inhaler 1Puff(s) Inhalation daily  insulin glargine Injectable (LANTUS) 30Unit(s) SubCutaneous at bedtime  metoprolol 25milliGRAM(s) Oral two times a day  senna 2Tablet(s) Oral at bedtime  torsemide 40milliGRAM(s) Oral daily  metolazone 2.5milliGRAM(s) Oral daily  spironolactone 25milliGRAM(s) Oral two times a day      Physical Exam:    Neuro: AAOx3.  No focal deficits.    Pulm: Diminished BLL.    CV: RRR.  +S1+S2    Abd: Soft/NT/ND.  +BS.      Extremities: Trace edema BLE.  +pp.       PAST MEDICAL & SURGICAL HISTORY:  Spinal stenosis  Sleep apnea  Memory loss, short term  Hypoxia  Obesity  NSTEMI (non-ST elevated myocardial infarction)  Non-rheumatic mitral regurgitation  Ischemic cardiomyopathy  HLD (hyperlipidemia)  GERD (gastroesophageal reflux disease)  Diastolic dysfunction with acute on chronic heart failure  COPD (chronic obstructive pulmonary disease)  Charcot foot due to diabetes mellitus  CKD (chronic kidney disease) stage 2, GFR 60-89 ml/min  Hooper&#x27;s esophagus  PAD (peripheral artery disease)  CAD (coronary artery disease): multiple MI, PCI, CABG/TMR  Morbid obesity  Retinopathy  DM (diabetes mellitus)  Dyslipidemia  HTN (hypertension)  Coronary stent occlusion, subsequent encounter  History of transmyocardial revascularization  History of coronary artery bypass graft: TMR, 2001 [place]  History of amputation: 1/2 LLE great toe  1st and 2nd toes rt foot  History of wrist fracture: left repaired  History of cataract extraction

## 2017-06-17 NOTE — CONSULT NOTE ADULT - SUBJECTIVE AND OBJECTIVE BOX
Renal :    Known CKD -3 (  - Nephrology in [facility] )    Chief complaint:      Patient is a 62y old  W Male who presents with a chief complaint of Transfer from [place] - Dyspnea  x 2 Months (2017 18:55)      HPI:  This is a 63 y/o M with h/o known diastolic heart failure, moderate MR ( ECHO in May) had a recent admission at [facility],  discharged on  and readmitted on  with CHF. Pt has a h/o of Dm ( on Insulin) multiple PCI (on Plavix HTN,depression, CKD-3, PAD with right femoral stent,CAD s/p CABG , and > 40 stents, TMR X 2,  Neuropathy, Charcot foot,  STML & Ataxia.  Home O2 the last 2 months for ,  Pt was seen by Dr Gong in the office and advised pt to go to [facility] for Acute Decompensated  diastolic Heart Failure.  Pt states he feels better now being that has been on lasix since admission and noticed that his LE edema  improved.  Denies CP, HA, N & V, dizziness (2017 18:55)  patient had 2-3 episodes of lower extremity edema w.  worsening heart failure.   last month he had chest pain too and got a recent PCI.   chest pain resolved. but dyspnea persists.         REVIEW OF SYMPTOMS: Cardiovascular:  See HPI. No chest pain,   (+) dyspnea,  No syncope,  No palpitations, No dizziness, + Orthopnea,      +  Paroxsymal nocturnal dyspnea;  Respiratory:  +  Dyspnea, No cough,     Genitourinary:  No dysuria, no hematuria; Gastrointestinal:  No nausea, no vomiting. No diarrhea.  No abdominal pain. No dark color stool, no melena ; Neurological: No headache, no dizziness, no slurred speech;  Psychiatric: No agitation, no anxiety.      ALL OTHER REVIEW OF SYSTEMS ARE NEGATIVE.    ALLERGIES: No Known Allergies    Intolerances-NONE     CURRENT MEDICATIONS:  tamsulosin 0.4milliGRAM(s) Oral at bedtime  metoprolol 25milliGRAM(s) Oral two times a day    tiotropium 18 MICROgram(s) Capsule  mometasone 220 MICROgram(s) Inhaler   aspirin  gabapentin  LORazepam     Tablet  citalopram  traZODone  traZODone  pantoprazole    Tablet  docusate sodium  senna  atorvastatin  clopidogrel Tablet  fluticasone propionate 50 MICROgram(s)/spray Nasal Spray  insulin lispro (HumaLOG) corrective regimen sliding scale  dextrose 5%.  dextrose 50% Injectable  dextrose 50% Injectable  dextrose 50% Injectable  heparin  Injectable  insulin lispro Injectable (HumaLOG)  insulin glargine Injectable (LANTUS)    PAST MEDICAL HISTORY  Spinal stenosis  Sleep apnea  Hypoxia  NSTEMI (non-ST elevated myocardial infarction)  Non-rheumatic mitral regurgitation  Ischemic cardiomyopathy  Diastolic dysfunction with acute on chronic heart failure  COPD (chronic obstructive pulmonary disease)  Charcot foot due to diabetes mellitus  CKD (chronic kidney disease) stage 2, GFR 60-89 ml/min  Hooper's esophagus  PAD (peripheral artery disease)  CAD (coronary artery disease)  Retinopathy  DM (diabetes mellitus)  HTN (hypertension)      PAST SURGICAL HISTORY  Coronary stent occlusion, subsequent encounter  History of transmyocardial revascularization  History of coronary artery bypass graft  History of amputation  History of wrist fracture  History of cataract extraction      FAMILY HISTORY:  Family history of diabetes mellitus (DM) (Mother): Mother  Family history of CABG (Sibling): sister      SOCIAL HISTORY:  Denies smoking/alcohol/drugs  Vital Signs Last 24 Hrs  T(C): 36.5, Max: 36.7 (06-15 @ 21:30)  T(F): 97.7, Max: 98.1 ( @ 05:09)  HR: 64 (56 - 64)  BP: 108/52 (85/42 - 112/52)  BP(mean): --  RR: 16 (16 - 20)  SpO2: 98% (94% - 98%)      PHYSICAL EXAM:  Constitutional: Comfortable . No acute distress.   HEENT: Atraumatic and normocephalic , neck is supple . no JVD. No carotid bruit. PEERL   CNS: A&Ox3. No focal deficits. EOMI. Cranial nerves II-IX are intact.   Lymph Nodes: Cervical : Not palpable.  Respiratory: decrease breath sounds in the bases. mild crepts in the base.  Cardiovascular: S1S2 RRR. Distant heart sounds. very faint systolic murmur.  rubs or gallop.  Gastrointestinal: Soft non-tender and non distended . +Bowel sounds. negative Garcia's sign.  Extremities: No edema.   Psychiatric: Calm . no agitation.  Skin: No skin rash/ulcers visualized to face, hands or feet.    Intake and output: I & Os for 24h ending  @ 07:00  =============================================  IN: 680 ml / OUT: 1200 ml / NET: -520 ml    I & Os for current day (as of  @ 18:08)  =============================================  IN: 60 ml / OUT: 850 ml / NET: -790 ml      LABS:    138    |  98     |  47.0<H>  ----------------------------<  94     Ca:9.1   (2017 05:01)  4.6     |  28.0   |  1.55<H>    eGFR if : 54 <L>    TPro  6.6 g/dL  /  Alb  3.5 g/dL  /  TBili  0.5 mg/dL  /  DBili  x      /  AST  21 U/L  /  ALT  20 U/L  /  AlkPhos  72 U/L  2017 05:01                        11.3<L>  6.3   )-----------( 179      ( 2017 05:01 )             35.8<L>    Phos:4.2 mg/dL M.1 mg/dL PTH:-- Uric acid:-- Serum Osm:--  Ferritin:-- Iron:-- TIBC:-- Tsat:--  B12:-- TSH:-- ( @ 05:01)    Urinalysis Basic - ( 2017 15:42 )  Color: Yellow / Appearance: Clear / S.015 / pH: x  Gluc: x / Ketone: Negative  / Bili: Negative / Urobili: Negative mg/dL   Blood: x / Protein: Negative mg/dL / Nitrite: Negative   Leuk Esterase: Trace<!> / RBC: 0-2 /HPF / WBC 6-10<!>   Sq Epi: x / Non Sq Epi: Occasional / Bacteria: Occasional<!>                              10.7   6.9   )-----------( 188      ( 2017 06:51 )             33.6     06-16    135  |  94<L>  |  54.0<H>  ----------------------------<  189<H>  4.3   |  33.0<H>  |  1.59<H>    Ca    9.1      2017 06:51  Phos  4.7       Mg     2.0         TPro  6.6  /  Alb  3.5  /  TBili  0.4  /  DBili  x   /  AST  20  /  ALT  19  /  AlkPhos  75  06-15      ;p-BNP=Serum Pro-Brain Natriuretic Peptide: 678 pg/mL ( @ 20:44)    PT/INR - ( 2017 20:44 )   PT: 11.4 sec;   INR: 1.04 ratio         PTT - ( 2017 20:44 )  PTT:37.5 sec  Urinalysis Basic - ( 2017 15:42 )    Color: Yellow / Appearance: Clear / S.015 / pH: x  Gluc: x / Ketone: Negative  / Bili: Negative / Urobili: Negative mg/dL   Blood: x / Protein: Negative mg/dL / Nitrite: Negative   Leuk Esterase: Trace / RBC: 0-2 /HPF / WBC 6-10   Sq Epi: x / Non Sq Epi: Occasional / Bacteria: Occasional        ECG: Normal sinus rhythm Minimal voltage criteria for LVH, may be normal variant  Nonspecific T wave abnormality  Prolonged QT    RADIOLOGY & ADDITIONAL STUDIES:     2017  Mild pulmonary vascular congestion. Cardiomegaly. Stable exam without   significant change since the previous study..     ECHO FINDINGS: Date: 6/15/2017: EF 60%. Grade II diastolic dysfunction. Severe MR w. mild LVH./ Normal RV.     MARGARETH: 2017: EF normal. Moderate MR. Grade III diastolic dysfunction. No effusion. Mild TR. Mild PAH.       Assessment and Recommendation:   		  62 M with multiple comorbidities, CAD s/p CABG , with PCI multiple stents, and TMLR x 2, (transmyocardial laser revascularization), DM , diastolic heart failure with recurrent heart failure admissions.     Problem/Recommendation - 1: Per Cardiology & CTS,    Acute diastolic heart failure due to  Mitral valvular disease.     Mitral valve clip procedure ,  RHC and cardiomem before or after clip.       Problem/Recommendation - 2:    CKD (chronic kidney disease) stage 3, Anemia & Hypoalbuminemia,    Will optimize , in the context of CHF management & Cardiac interventions,

## 2017-06-17 NOTE — PROGRESS NOTE ADULT - SUBJECTIVE AND OBJECTIVE BOX
History of Present Illness:  Chief Complaint/Reason for Admission: [place] [facility]	  History of Present Illness: 	  This is a 61 y/o M with h/o known diastolic heart failure moderate MR ( ECHO in May) had a recent admission at [facility],  discharged on 5/31 and readmitted on 6/7 with CHF. Pt has a h/o of Dm ( on Insulin) multiple PCI ( on plavix) HTN, HLD, depression, CKD, PAD, GERD, TMR X 2,  Neuropathy, Charcot foot,  STML & balance issues  for which he sees a neurologist,  Home O2 the last  2 months for CHF ( Dr Gong placed pt on).  Pt was seen by Dr Gong in the office and advised pt to go to [facility] for Acute Decompensated  diastolic Heart Failure.  Pt states he feels better now being that hes been on lasix since admission and noticed that his LE edema has gone down.  Denies CP, HA, N & V, dizziness     ICU Vital Signs Last 24 Hrs  T(C): 36.6, Max: 36.6 (06-17 @ 12:20)  T(F): 97.9, Max: 97.9 (06-17 @ 12:20)  HR: 68 (61 - 70)  BP: 118/60 (103/54 - 118/60)  BP(mean): --  ABP: --  ABP(mean): --  RR: 18 (15 - 18)  SpO2: 96% (96% - 99%)    Magnesium, Serum (06.17.17 @ 05:01)    Magnesium, Serum: 2.1 mg/dL    Comprehensive Metabolic Panel (06.17.17 @ 05:01)    Sodium, Serum: 138 mmol/L    Potassium, Serum: 4.6 mmol/L    Chloride, Serum: 98 mmol/L    Carbon Dioxide, Serum: 28.0 mmol/L    Anion Gap, Serum: 12 mmol/L    Blood Urea Nitrogen, Serum: 47.0 mg/dL    Creatinine, Serum: 1.55 mg/dL    Glucose, Serum: 94 mg/dL    Calcium, Total Serum: 9.1 mg/dL    Protein Total, Serum: 6.6 g/dL    Albumin, Serum: 3.5 g/dL    Bilirubin Total, Serum: 0.5 mg/dL    Alkaline Phosphatase, Serum: 72 U/L    Aspartate Aminotransferase (AST/SGOT): 21 U/L    Alanine Aminotransferase (ALT/SGPT): 20 U/L    eGFR if Non : 47: Interpretative comment  The units for eGFR are ml/min/1.73m2 (normalized body surface area). The  eGFR is calculated from a serum creatinine using the CKD-EPI equation.  Other variables required for calculation are race, age and sex. Among  patients w47: ith chronic kidney disease (CKD), the eGFR is useful in  determining the stage of disease according to KDOQI CKD classification.  All eGFR results are reported numerically with the following  interpretation.          GFR                    With47:                  Without     (ml/min/1.73 m2)    Kidney Damage       Kidney Damage        >= 90                    Stage 1                     Normal        60-89                    Stage 2                     Decreased GFR        :      Stage 3                     Stage 3        15-29                    Stage 4                     Stage 4        < 15                      Stage 5                     Stage 5  Each stage of CKD assumes that the associated GFR level has been in  eff47: ect for at least 3 months. Determination of stages one and two (with  eGFR > 59 ml/min/m2) requires estimation of kidney damage for at least 3  months as defined by structural or functional abnormalities.  Limitations: All estimates of GFR will be les47: s accurate for patients at  extremes of muscle mass (including but not limited to frail elderly,  critically ill, or cancer patients), those with unusual diets, and those  with conditions associated with reduced secretion or extrarenal  elimination of47:  creatinine. The eGFR equation is not recommended for use  in patients with unstable creatinine levels. mL/min/1.73M2    eGFR if African American: 54 mL/min/1.73M2

## 2017-06-17 NOTE — PROGRESS NOTE ADULT - PROBLEM SELECTOR PLAN 2
Hypotension improved overnight.    Discussed cardiac meds with Dr. Gonzalez yesterday.  Continued Lopresor 25mg BID, and started Zaroxolyn, Torsemide, and aldactone as recommended.    Vasotec and Imdur deferred as per cardiology at this time.  DASH/TLC diet. Hypotension improved overnight.    Discussed cardiac meds with Dr. Gonzalez yesterday.  Continued Lopresor 25mg BID, and started Zaroxolyn, Torsemide, and aldactone as recommended.    Vasotec, Norvasc, and Imdur deferred as per cardiology at this time.  DASH/TLC diet.

## 2017-06-18 VITALS
RESPIRATION RATE: 18 BRPM | SYSTOLIC BLOOD PRESSURE: 122 MMHG | HEART RATE: 68 BPM | OXYGEN SATURATION: 98 % | DIASTOLIC BLOOD PRESSURE: 60 MMHG

## 2017-06-18 DIAGNOSIS — I21.4 NON-ST ELEVATION (NSTEMI) MYOCARDIAL INFARCTION: ICD-10-CM

## 2017-06-18 DIAGNOSIS — Z86.39 PERSONAL HISTORY OF OTHER ENDOCRINE, NUTRITIONAL AND METABOLIC DISEASE: ICD-10-CM

## 2017-06-18 DIAGNOSIS — Z86.69 PERSONAL HISTORY OF OTHER DISEASES OF THE NERVOUS SYSTEM AND SENSE ORGANS: ICD-10-CM

## 2017-06-18 DIAGNOSIS — Z87.19 PERSONAL HISTORY OF OTHER DISEASES OF THE DIGESTIVE SYSTEM: ICD-10-CM

## 2017-06-18 DIAGNOSIS — K22.70 BARRETT'S ESOPHAGUS W/OUT DYSPLASIA: ICD-10-CM

## 2017-06-18 DIAGNOSIS — Z87.891 PERSONAL HISTORY OF NICOTINE DEPENDENCE: ICD-10-CM

## 2017-06-18 DIAGNOSIS — Z87.09 PERSONAL HISTORY OF OTHER DISEASES OF THE RESPIRATORY SYSTEM: ICD-10-CM

## 2017-06-18 DIAGNOSIS — I34.0 NONRHEUMATIC MITRAL (VALVE) INSUFFICIENCY: ICD-10-CM

## 2017-06-18 DIAGNOSIS — Z86.79 PERSONAL HISTORY OF OTHER DISEASES OF THE CIRCULATORY SYSTEM: ICD-10-CM

## 2017-06-18 DIAGNOSIS — Z87.39 PERSONAL HISTORY OF OTHER DISEASES OF THE MUSCULOSKELETAL SYSTEM AND CONNECTIVE TISSUE: ICD-10-CM

## 2017-06-18 DIAGNOSIS — N18.2 CHRONIC KIDNEY DISEASE, STAGE 2 (MILD): ICD-10-CM

## 2017-06-18 DIAGNOSIS — I73.9 PERIPHERAL VASCULAR DISEASE, UNSPECIFIED: ICD-10-CM

## 2017-06-18 LAB
ANION GAP SERPL CALC-SCNC: 13 MMOL/L — SIGNIFICANT CHANGE UP (ref 5–17)
BUN SERPL-MCNC: 44 MG/DL — HIGH (ref 8–20)
CALCIUM SERPL-MCNC: 9.7 MG/DL — SIGNIFICANT CHANGE UP (ref 8.6–10.2)
CHLORIDE SERPL-SCNC: 96 MMOL/L — LOW (ref 98–107)
CO2 SERPL-SCNC: 31 MMOL/L — HIGH (ref 22–29)
CREAT SERPL-MCNC: 1.46 MG/DL — HIGH (ref 0.5–1.3)
GLUCOSE SERPL-MCNC: 103 MG/DL — SIGNIFICANT CHANGE UP (ref 70–115)
HCT VFR BLD CALC: 38.6 % — LOW (ref 42–52)
HGB BLD-MCNC: 12.1 G/DL — LOW (ref 14–18)
MAGNESIUM SERPL-MCNC: 2 MG/DL — SIGNIFICANT CHANGE UP (ref 1.6–2.6)
MCHC RBC-ENTMCNC: 27.2 PG — SIGNIFICANT CHANGE UP (ref 27–31)
MCHC RBC-ENTMCNC: 31.3 G/DL — LOW (ref 32–36)
MCV RBC AUTO: 86.7 FL — SIGNIFICANT CHANGE UP (ref 80–94)
PHOSPHATE SERPL-MCNC: 4.2 MG/DL — SIGNIFICANT CHANGE UP (ref 2.4–4.7)
PLATELET # BLD AUTO: 179 K/UL — SIGNIFICANT CHANGE UP (ref 150–400)
POTASSIUM SERPL-MCNC: 4.2 MMOL/L — SIGNIFICANT CHANGE UP (ref 3.5–5.3)
POTASSIUM SERPL-SCNC: 4.2 MMOL/L — SIGNIFICANT CHANGE UP (ref 3.5–5.3)
RBC # BLD: 4.45 M/UL — LOW (ref 4.6–6.2)
RBC # FLD: 16.1 % — HIGH (ref 11–15.6)
SODIUM SERPL-SCNC: 140 MMOL/L — SIGNIFICANT CHANGE UP (ref 135–145)
WBC # BLD: 9.1 K/UL — SIGNIFICANT CHANGE UP (ref 4.8–10.8)
WBC # FLD AUTO: 9.1 K/UL — SIGNIFICANT CHANGE UP (ref 4.8–10.8)

## 2017-06-18 PROCEDURE — 99239 HOSP IP/OBS DSCHRG MGMT >30: CPT

## 2017-06-18 PROCEDURE — 71010: CPT | Mod: 26

## 2017-06-18 RX ORDER — TIOTROPIUM BROMIDE 18 UG/1
1 CAPSULE ORAL; RESPIRATORY (INHALATION)
Qty: 1 | Refills: 1 | OUTPATIENT
Start: 2017-06-18 | End: 2017-08-16

## 2017-06-18 RX ORDER — ASPIRIN/CALCIUM CARB/MAGNESIUM 324 MG
1 TABLET ORAL
Qty: 0 | Refills: 0 | COMMUNITY
Start: 2017-06-18

## 2017-06-18 RX ORDER — SPIRONOLACTONE 25 MG/1
1 TABLET, FILM COATED ORAL
Qty: 60 | Refills: 1 | OUTPATIENT
Start: 2017-06-18 | End: 2017-08-16

## 2017-06-18 RX ORDER — DOCUSATE SODIUM 100 MG
1 CAPSULE ORAL
Qty: 0 | Refills: 0 | COMMUNITY
Start: 2017-06-18

## 2017-06-18 RX ORDER — INSULIN GLARGINE 100 [IU]/ML
60 INJECTION, SOLUTION SUBCUTANEOUS
Qty: 1 | Refills: 1 | OUTPATIENT
Start: 2017-06-18 | End: 2017-08-16

## 2017-06-18 RX ORDER — TAMSULOSIN HYDROCHLORIDE 0.4 MG/1
1 CAPSULE ORAL
Qty: 0 | Refills: 0 | COMMUNITY
Start: 2017-06-18

## 2017-06-18 RX ORDER — SPIRONOLACTONE 25 MG/1
1 TABLET, FILM COATED ORAL
Qty: 60 | Refills: 1 | COMMUNITY
Start: 2017-06-18 | End: 2017-08-16

## 2017-06-18 RX ORDER — SENNA PLUS 8.6 MG/1
2 TABLET ORAL
Qty: 0 | Refills: 0 | COMMUNITY
Start: 2017-06-18

## 2017-06-18 RX ORDER — METOPROLOL TARTRATE 50 MG
1 TABLET ORAL
Qty: 60 | Refills: 1 | OUTPATIENT
Start: 2017-06-18 | End: 2017-08-16

## 2017-06-18 RX ORDER — ATORVASTATIN CALCIUM 80 MG/1
1 TABLET, FILM COATED ORAL
Qty: 30 | Refills: 1 | OUTPATIENT
Start: 2017-06-18 | End: 2017-08-16

## 2017-06-18 RX ORDER — MOMETASONE FUROATE 220 UG/1
1 INHALANT RESPIRATORY (INHALATION)
Qty: 1 | Refills: 1 | OUTPATIENT
Start: 2017-06-18 | End: 2017-08-16

## 2017-06-18 RX ORDER — ACETAMINOPHEN 500 MG
2 TABLET ORAL
Qty: 0 | Refills: 0 | COMMUNITY
Start: 2017-06-18

## 2017-06-18 RX ORDER — CLOPIDOGREL BISULFATE 75 MG/1
1 TABLET, FILM COATED ORAL
Qty: 30 | Refills: 1 | OUTPATIENT
Start: 2017-06-18 | End: 2017-08-16

## 2017-06-18 RX ADMIN — Medication 40 MILLIGRAM(S): at 06:41

## 2017-06-18 RX ADMIN — CLOPIDOGREL BISULFATE 75 MILLIGRAM(S): 75 TABLET, FILM COATED ORAL at 11:35

## 2017-06-18 RX ADMIN — Medication 325 MILLIGRAM(S): at 11:35

## 2017-06-18 RX ADMIN — TIOTROPIUM BROMIDE 1 CAPSULE(S): 18 CAPSULE ORAL; RESPIRATORY (INHALATION) at 09:09

## 2017-06-18 RX ADMIN — GABAPENTIN 400 MILLIGRAM(S): 400 CAPSULE ORAL at 00:13

## 2017-06-18 RX ADMIN — GABAPENTIN 400 MILLIGRAM(S): 400 CAPSULE ORAL at 06:41

## 2017-06-18 RX ADMIN — GABAPENTIN 400 MILLIGRAM(S): 400 CAPSULE ORAL at 11:35

## 2017-06-18 RX ADMIN — Medication 5 UNIT(S): at 09:11

## 2017-06-18 RX ADMIN — CITALOPRAM 40 MILLIGRAM(S): 10 TABLET, FILM COATED ORAL at 11:35

## 2017-06-18 RX ADMIN — HEPARIN SODIUM 5000 UNIT(S): 5000 INJECTION INTRAVENOUS; SUBCUTANEOUS at 06:41

## 2017-06-18 RX ADMIN — PANTOPRAZOLE SODIUM 40 MILLIGRAM(S): 20 TABLET, DELAYED RELEASE ORAL at 06:41

## 2017-06-18 RX ADMIN — Medication 25 MILLIGRAM(S): at 06:41

## 2017-06-18 RX ADMIN — SODIUM CHLORIDE 3 MILLILITER(S): 9 INJECTION INTRAMUSCULAR; INTRAVENOUS; SUBCUTANEOUS at 06:25

## 2017-06-18 RX ADMIN — Medication 100 MILLIGRAM(S): at 06:41

## 2017-06-18 RX ADMIN — Medication 100 MILLIGRAM(S): at 00:13

## 2017-06-18 RX ADMIN — Medication 2 SPRAY(S): at 11:36

## 2017-06-18 RX ADMIN — SPIRONOLACTONE 25 MILLIGRAM(S): 25 TABLET, FILM COATED ORAL at 06:41

## 2017-06-18 RX ADMIN — SODIUM CHLORIDE 3 MILLILITER(S): 9 INJECTION INTRAMUSCULAR; INTRAVENOUS; SUBCUTANEOUS at 13:11

## 2017-06-18 RX ADMIN — Medication 5 UNIT(S): at 11:36

## 2017-06-18 RX ADMIN — Medication 100 MILLIGRAM(S): at 13:41

## 2017-06-18 RX ADMIN — Medication 50 MILLIGRAM(S): at 00:14

## 2017-06-18 RX ADMIN — Medication 2 MILLIGRAM(S): at 00:13

## 2017-06-18 NOTE — PROGRESS NOTE ADULT - SUBJECTIVE AND OBJECTIVE BOX
Subjective: "I need another opinion about surgery.  If I call the office tomorrow, can I speak to Dr. Faulkner?"  Sitting up in bed.  Denies SOB or CP.  NAD noted.      Tele:  SR                        T(F): 97.9, Max: 98.9 ( @ 16:16)  HR: 68 (59 - 68)  BP: 124/58 (108/50 - 124/58)  RR: 16 (16 - 18)  SpO2: 98% (96% - 98%) on 2 liters nasal O2.          Daily     Daily Weight in k.2 (2017 06:59)    LV EF: 60%    No Known Allergies          140  |  96<L>  |  44.0<H>  ----------------------------<  103  4.2   |  31.0<H>  |  1.46<H>    Ca    9.7      2017 07:45  Phos  4.2       Mg     2.0         TPro  6.6  /  Alb  3.5  /  TBili  0.5  /  DBili  x   /  AST  21  /  ALT  20  /  AlkPhos  72                                 12.1   9.1   )-----------( 179      ( 2017 07:45 )             38.6               CAPILLARY BLOOD GLUCOSE  206 (2017 21:06)  141 (2017 17:23)  156 (2017 12:20)           CXR:     I&O's Detail    I & Os for current day (as of 2017 11:26)  =============================================  IN:    Oral Fluid: 780 ml    Total IN: 780 ml  ---------------------------------------------  OUT:    Voided: 3280 ml    Total OUT: 3280 ml  ---------------------------------------------  Total NET: -2500 ml          Active Medications:  sodium chloride 0.9% lock flush 3milliLiter(s) IV Push every 8 hours  aspirin 325milliGRAM(s) Oral daily  gabapentin 400milliGRAM(s) Oral four times a day  LORazepam     Tablet 2milliGRAM(s) Oral at bedtime  citalopram 40milliGRAM(s) Oral daily  traZODone 50milliGRAM(s) Oral at bedtime  traZODone 100milliGRAM(s) Oral at bedtime  atorvastatin 80milliGRAM(s) Oral at bedtime  clopidogrel Tablet 75milliGRAM(s) Oral daily  fluticasone propionate 50 MICROgram(s)/spray Nasal Spray 2Spray(s) Both Nostrils daily  pantoprazole    Tablet 40milliGRAM(s) Oral before breakfast  insulin lispro (HumaLOG) corrective regimen sliding scale  SubCutaneous three times a day before meals  dextrose 5%. 1000milliLiter(s) IV Continuous <Continuous>  dextrose Gel 1Dose(s) Oral once PRN  dextrose 50% Injectable 12.5Gram(s) IV Push once  dextrose 50% Injectable 25Gram(s) IV Push once  dextrose 50% Injectable 25Gram(s) IV Push once  glucagon  Injectable 1milliGRAM(s) IntraMuscular once PRN  docusate sodium 100milliGRAM(s) Oral three times a day  acetaminophen   Tablet. 650milliGRAM(s) Oral every 6 hours PRN  heparin  Injectable 5000Unit(s) SubCutaneous every 8 hours  tamsulosin 0.4milliGRAM(s) Oral at bedtime  insulin lispro Injectable (HumaLOG) 5Unit(s) SubCutaneous three times a day before meals  tiotropium 18 MICROgram(s) Capsule 1Capsule(s) Inhalation daily  mometasone 220 MICROgram(s) Inhaler 1Puff(s) Inhalation daily  insulin glargine Injectable (LANTUS) 30Unit(s) SubCutaneous at bedtime  metoprolol 25milliGRAM(s) Oral two times a day  senna 2Tablet(s) Oral at bedtime  torsemide 40milliGRAM(s) Oral daily  metolazone 2.5milliGRAM(s) Oral daily  spironolactone 25milliGRAM(s) Oral two times a day      Physical Exam:    Neuro: AAOX3.  No focal deficits.    Pulm: Diminished BLL.      CV: RRR.  +S1+S2.    Abd: Soft/NT/ND.  +BS.    Extremities: Trace edema BLE.  +pp.       PAST MEDICAL & SURGICAL HISTORY:  Spinal stenosis  Sleep apnea  Memory loss, short term  Hypoxia  Obesity  NSTEMI (non-ST elevated myocardial infarction)  Non-rheumatic mitral regurgitation  Ischemic cardiomyopathy  HLD (hyperlipidemia)  GERD (gastroesophageal reflux disease)  Diastolic dysfunction with acute on chronic heart failure  COPD (chronic obstructive pulmonary disease)  Charcot foot due to diabetes mellitus  CKD (chronic kidney disease) stage 2, GFR 60-89 ml/min  Hooper&#x27;s esophagus  PAD (peripheral artery disease)  CAD (coronary artery disease): multiple MI, PCI, CABG/TMR  Morbid obesity  Retinopathy  DM (diabetes mellitus)  Dyslipidemia  HTN (hypertension)  Coronary stent occlusion, subsequent encounter  History of transmyocardial revascularization  History of coronary artery bypass graft: TMR,  [place]  History of amputation: 1/2 LLE great toe  1st and 2nd toes rt foot  History of wrist fracture: left repaired  History of cataract extraction

## 2017-06-18 NOTE — DISCHARGE NOTE ADULT - CARE PLAN
Principal Discharge DX:	Non-rheumatic mitral regurgitation  Goal:	To remain symptom free  Instructions for follow-up, activity and diet:	Please follow up with Dr. Vu and Dr. Faulkner in the office at [facility], on the 2nd floor to discuss plans for Mitral Clip procedure.  Please call on the next business day to make an appointment.  [phone].  Continue diuretics as prescribed.  Maintain low sodium diet.  Weigh yourself daily.  Call the office if you notice greater than a 2lb weight gain in 24 hours.  Follow up with your cardiologist.  Secondary Diagnosis:	Pulmonary emphysema, unspecified emphysema type  Instructions for follow-up, activity and diet:	Please follow up with your pulmonologist.   Wear your home oxygen as prescribed.  continue inhalers as prescribed. Principal Discharge DX:	Non-rheumatic mitral regurgitation  Goal:	To remain symptom free  Instructions for follow-up, activity and diet:	Please follow up with Dr. Vu and Dr. Faulkner in the office at [facility], on the 2nd floor to discuss plans for Mitral Clip procedure.  Please call on the next business day to make an appointment.  [phone].  Continue diuretics as prescribed.  Maintain low sodium diet.  Weigh yourself daily.  Call the office if you notice greater than a 2lb weight gain in 24 hours.  Follow up with your cardiologist.  Secondary Diagnosis:	Pulmonary emphysema, unspecified emphysema type  Instructions for follow-up, activity and diet:	Please follow up with your pulmonologist.   Wear your home oxygen as prescribed.  continue inhalers as prescribed. Principal Discharge DX:	Non-rheumatic mitral regurgitation  Goal:	To remain symptom free  Instructions for follow-up, activity and diet:	Please follow up with Dr. Vu and Dr. Faulkner in the office at [facility], on the 2nd floor to discuss plans for Mitral Clip procedure.  Please call on the next business day to make an appointment.  [phone].  Continue diuretics as prescribed.  Maintain low sodium diet.  Weigh yourself daily.  Call the office if you notice greater than a 2lb weight gain in 24 hours.  Follow up with your cardiologist.  Secondary Diagnosis:	Pulmonary emphysema, unspecified emphysema type  Instructions for follow-up, activity and diet:	Please follow up with your pulmonologist.   Wear your home oxygen as prescribed.  continue inhalers as prescribed.

## 2017-06-18 NOTE — DISCHARGE NOTE ADULT - PLAN OF CARE
To remain symptom free Please follow up with Dr. Vu and Dr. Faulkner in the office at [facility], on the 2nd floor to discuss plans for Mitral Clip procedure.  Please call on the next business day to make an appointment.  [phone].  Continue diuretics as prescribed.  Maintain low sodium diet.  Weigh yourself daily.  Call the office if you notice greater than a 2lb weight gain in 24 hours.  Follow up with your cardiologist. Please follow up with your pulmonologist.   Wear your home oxygen as prescribed.  continue inhalers as prescribed.

## 2017-06-18 NOTE — DISCHARGE NOTE ADULT - CARE PROVIDER_API CALL
Nate Vu), Surgery; Thoracic and Cardiac Surgery  [address] [place] [address]  Phone: [phone]  Fax: [phone]    Eric Faulkner), Surgery; Thoracic and Cardiac Surgery  [address] [place] [address]  Phone: [phone]  Fax: [phone]    Cynthia Chávez), Critical Care Medicine; Internal Medicine; Pulmonary Disease; Sleep Medicine  [address] [place] [address]  Phone: [phone]  Fax: [phone]    Mio Gong (), Cardiovascular Disease; Internal Medicine; Interventional Cardiology  [address] [place] [address] [address]  [place] [address]  Phone: [phone]  Fax: [phone]

## 2017-06-18 NOTE — PROGRESS NOTE ADULT - PROBLEM SELECTOR PLAN 2
Hypotension improved.   Discussed cardiac meds with Dr. Gonzalez .  Continued Lopressor 25mg BID, and Zaroxolyn, Torsemide, and aldactone as recommended.    Vasotec, Norvasc, and Imdur deferred as per cardiology at this time.  DASH/TLC diet.

## 2017-06-18 NOTE — PROGRESS NOTE ADULT - PROBLEM SELECTOR PLAN 4
Continue Aldactone, Zaroxolyn, and Torsemide for diuresis.  Daily weights.
Continue Aldactone, Zaroxolyn, and Torsemide for diuresis.  Daily weights.
TTE done yesterday.  MARGARETH scheduled for this afternoon as per Dr. Gonzalez.  D/C Lasix and Aldactone for now for hypotension.  Will be addressed later today by cardiology.  Discussed with Dr. Gonzalez.
pulm consult  RA ABRAUDEL  nebs  Pt states cannot receive albuterol

## 2017-06-18 NOTE — PROGRESS NOTE ADULT - PROBLEM SELECTOR PLAN 9
Conitinue Colace and Senna for bowel regimen.
Conitinue Colace and Senna for bowel regimen.
Conitinue Colace.  Erwin added per patient request.

## 2017-06-18 NOTE — DISCHARGE NOTE ADULT - CARE PROVIDERS DIRECT ADDRESSES
,[email or web address]

## 2017-06-18 NOTE — PROGRESS NOTE ADULT - PROBLEM SELECTOR PROBLEM 5
Pulmonary emphysema, unspecified emphysema type
CKD (chronic kidney disease) stage 2, GFR 60-89 ml/min

## 2017-06-18 NOTE — PROGRESS NOTE ADULT - PROBLEM SELECTOR PROBLEM 3
Coronary artery disease of native artery of native heart with stable angina pectoris

## 2017-06-18 NOTE — PROGRESS NOTE ADULT - PROBLEM SELECTOR PLAN 10
SQ heparin for DVT prophylaxis.  Protonix for GI prophylaxis.    Discussed with CT surgery team and Dr. Vu in AM rounds.
SQ heparin for DVT prophylaxis.  Protonix for GI prophylaxis.    Plan for discharge home today.  Pt to follow up in the office with Dr. Vu for plan for Mitral Clip.  Discussed with CT surgery team and Dr. Vu in AM rounds.
SQ heparin for DVT prophylaxis.  Protonix for GI prophylaxis.    Plan for MARGARETH today.  Dr. Faulkner to determine if pt is candidate for Mitral Clip vs Open MVR.  Discussed with CT surgery team in AM rounds, as well as Dr. Faulkner.

## 2017-06-18 NOTE — DISCHARGE NOTE ADULT - INSTRUCTIONS
A registered dietician can help you create a personalized plan for healthy eating. Make your calories count by choosin. Healthy carbohydrates such as fruits, vegetables, whole grains, legumes (beans, peas and lentils) and low-fat dairy products.  2. Fiber-rich foods such as vegetables, fruits, nuts, legumes, whole-wheat flour and wheat bran.  3. Heart-healthy fish at least twice a week such as cod, tuna and halibut, which are low-fat options as well as salmon, mackerel, tuna, sardines and bluefish, which are rich in omega-3 fatty acids. Avoid fish with high levels of mercury.  4. "Good" fats such as avocados, almonds, pecans, walnuts, olives and canola, olive and peanut oils.     Minimize foods with high saturated fats (beef, hot dogs, sausage and perry), trans fats (processed snacks, baked goods, shortening and stick margarines), high cholesterol foods (high fat dairy products and animal proteins, fried food) and high sodium foods (fast food, many frozen foods, processed food).

## 2017-06-18 NOTE — PROGRESS NOTE ADULT - PROBLEM SELECTOR PROBLEM 1
Mitral regurgitation
Non-rheumatic mitral regurgitation
Mitral regurgitation

## 2017-06-18 NOTE — PROGRESS NOTE ADULT - PROBLEM SELECTOR PROBLEM 7
Benign prostatic hyperplasia without lower urinary tract symptoms, unspecified morphology
Acute diastolic heart failure due to valvular disease

## 2017-06-18 NOTE — PROGRESS NOTE ADULT - PROBLEM SELECTOR PROBLEM 9
Constipation by delayed colonic transit

## 2017-06-18 NOTE — DISCHARGE NOTE ADULT - MEDICATION SUMMARY - MEDICATIONS TO STOP TAKING
I will STOP taking the medications listed below when I get home from the hospital:    Metoprolol Succinate  mg oral tablet, extended release  -- 1 tab(s) by mouth once a day    enalapril 10 mg oral tablet  -- 1 tab(s) by mouth once a day    Imdur 60 mg oral tablet, extended release  -- 1 tab(s) by mouth once a day (in the morning)    amlodipine 5 mg oral tablet  -- 1 tab(s) by mouth once a day

## 2017-06-18 NOTE — PROGRESS NOTE ADULT - SUBJECTIVE AND OBJECTIVE BOX
Pt Pharmacy is closed on Sundays ([facility] in [place]).  Discussed with patient, and pt refusing to allow prescriptions to be sent to a different pharmacy today.  Pt willing to pick prescriptions up tomorrow.

## 2017-06-18 NOTE — PROGRESS NOTE ADULT - PROBLEM SELECTOR PLAN 1
Preop workup in progress.  MARGARETH showed moderate MR.  Dr. Vu discussed Mitral Clip again with Pt this AM.  Patient to decide.  Plan is for patient to follow up as an outpatient.
Preop workup in progress.  MARGARETH showed moderate MR.  Dr. Vu discussed Mitral Clip with Pt this AM.  Patient to decide.  Plan is for patient to follow up as an outpatient.
Preop workup in progress.  MARGARETH today.
Preop workup in progress
Plan for Mitral clip as outpatient

## 2017-06-18 NOTE — PROGRESS NOTE ADULT - PROBLEM SELECTOR PROBLEM 6
CKD (chronic kidney disease) stage 2, GFR 60-89 ml/min
Type 2 diabetes mellitus with diabetic peripheral angiopathy and gangrene, with long-term current use of insulin

## 2017-06-18 NOTE — PROGRESS NOTE ADULT - PROBLEM SELECTOR PROBLEM 4
Acute diastolic heart failure due to valvular disease
Pulmonary emphysema, unspecified emphysema type

## 2017-06-18 NOTE — PROGRESS NOTE ADULT - PROBLEM SELECTOR PLAN 5
Pulmonary consult appreciated.  Room air ABG done 6/15.  Trial of Spiriva and Asmanex as recommended by Pulm.  Pt unable to tolerate albuterol due to tremors, per pt.  Nasal O2 as needed.  Daily CXR.
Pulmonary consult appreciated.  Room air ABG done 6/15.  Trial of Spiriva and Asmanex as recommended by Pulm.  Pt unable to tolerate albuterol due to tremors, per pt.  Nasal O2 as needed.  Daily CXR.
Pulmonary consult appreciated.  Room air ABG done yesterday.  Trial of Spiriva and Asmanex as recommended by Pulm.  Pt unable to tolerate albuterol due to tremors, per pt.  Nasal O2 as needed.  Daily CXR.
Monitor Cr/UO

## 2017-06-18 NOTE — PROGRESS NOTE ADULT - PROBLEM SELECTOR PLAN 7
Continue home dose Flomax.
Echo today  MARGARETH tomorrow

## 2017-06-18 NOTE — DISCHARGE NOTE ADULT - MEDICATION SUMMARY - MEDICATIONS TO TAKE
I will START or STAY ON the medications listed below when I get home from the hospital:    spironolactone 25 mg oral tablet  -- 1 tab(s) by mouth 2 times a day  -- Indication: For Diuretic    acetaminophen 325 mg oral tablet  -- 2 tab(s) by mouth every 6 hours, As needed, Mild Pain (1 - 3)  -- Indication: For PAin    aspirin 325 mg oral tablet  -- 1 tab(s) by mouth once a day  -- Indication: For Antiplatelet    tamsulosin 0.4 mg oral capsule  -- 1 cap(s) by mouth once a day (at bedtime)  -- Indication: For BPH    gabapentin 400 mg oral capsule  -- 1 cap(s) by mouth 4 times a day  -- Indication: For Neuropathy    lorazepam 2 mg oral tablet  -- 1 tab(s) by mouth once a day (at bedtime)  -- Indication: For Sleep     citalopram 40 mg oral tablet  -- 1 tab(s) by mouth once a day  -- Indication: For Depression    trazodone 150 mg oral tablet  -- 1 tab(s) by mouth once a day (at bedtime)  -- Indication: For Sleep     Humalog  --  subcutaneous , As Needed  -- Indication: For Diabetes    Lantus 100 units/mL subcutaneous solution  -- 60 unit(s) subcutaneous once a day  -- Do not drink alcoholic beverages when taking this medication.  It is very important that you take or use this exactly as directed.  Do not skip doses or discontinue unless directed by your doctor.  Keep in refrigerator.  Do not freeze.    -- Indication: For Diabetes    Lipitor 80 mg oral tablet  -- 1 tab(s) by mouth once a day (at bedtime)  -- Indication: For Hyperlipidemia    Plavix 75 mg oral tablet  -- 1 tab(s) by mouth once a day  -- Indication: For Antiplatelet    metoprolol tartrate 25 mg oral tablet  -- 1 tab(s) by mouth 2 times a day  -- Indication: For Hypertension    Spiriva 18 mcg inhalation capsule  -- 1 cap(s) inhaled once a day  -- Indication: For Emphysema    metOLazone 2.5 mg oral tablet  -- 1 tab(s) by mouth once a day  -- Avoid prolonged or excessive exposure to direct and/or artificial sunlight while taking this medication.  It is very important that you take or use this exactly as directed.  Do not skip doses or discontinue unless directed by your doctor.  It may be advisable to drink a full glass orange juice or eat a banana daily while taking this medication.    -- Indication: For Diuretic    torsemide 20 mg oral tablet  -- 2 tab(s) by mouth once a day  -- Indication: For Diuretic    docusate sodium 100 mg oral capsule  -- 1 cap(s) by mouth 3 times a day  -- Indication: For Stool softner    senna oral tablet  -- 2 tab(s) by mouth once a day (at bedtime)  -- Indication: For laxative    fluticasone nasal 50 mcg/inh nasal spray  -- 2 spray(s) into nose once a day  -- Indication: For Nasal spray    Protonix 40 mg oral delayed release tablet  -- 1 tab(s) by mouth once a day  -- Indication: For GERD (gastroesophageal reflux disease)    mometasone 220 mcg/inh inhalation aerosol powder  -- 1 microgram(s) inhaled once a day  -- Expires___________________  For inhalation only.  Rinse mouth thoroughly after use.    -- Indication: For Emphysema

## 2017-06-18 NOTE — PROGRESS NOTE ADULT - PROBLEM SELECTOR PLAN 8
Lantus, ALMA AC/HS, and Premeal for glycemic control.  Consistent carb diet.
Lantus, ALMA AC/HS, and Premeal for glycemic control.  Consistent carb diet.  Pt NPO for MARGARETH> will address Lantus dosing after MARGARETH (Pt takes 60 units at home).
Lantus, ALMA AC/HS, and Premeal for glycemic control.  Consistent carb diet.  Pt NPO for MARGARETH> will address Lantus dosing after MARGARETH (Pt takes 60 units at home).

## 2017-06-18 NOTE — PROGRESS NOTE ADULT - PROBLEM SELECTOR PROBLEM 2
Pulmonary emphysema, unspecified emphysema type
Essential hypertension
Pulmonary emphysema, unspecified emphysema type

## 2017-06-18 NOTE — DISCHARGE NOTE ADULT - HOSPITAL COURSE
2y Male h/o CAD s/p CABG and many PCI/stents ("44" per patient with "over 50" cardiac caths), TMR by Dr Faulkner at [facility], DM, was admitted on 6/14/17 from [facility] with moderate - severe MR for MV clip evaluation.    Prehospital course: acute diastolic heart failure, renal insufficiency    Hospital course: Pulmonary consult obtained for home oxygen requirement, poor PFTs.  6/16 Room air ABG  PCO2 50  PO2 53.  MARGARETH: Moderate MR.  Diastolic dysfunction.

## 2017-06-18 NOTE — PROGRESS NOTE ADULT - PROBLEM SELECTOR PLAN 3
Continue Plavix, ASA and Lipitor.  DASH/TLC diet.
plavix and asa

## 2017-06-18 NOTE — PROGRESS NOTE ADULT - ASSESSMENT
ASSESSMENT  62y Male h/o CAD s/p CABG and many PCI/stents ("44" per patient with "over 50" cardiac caths), TMR by Dr Faulkner at [facility], DM, was admitted on 6/14/17 from [facility] with moderate - severe MR for MV clip evaluation.    Prehospital course: acute diastolic heart failure, renal insufficiency    Hospital course: Pulmonary consult obtained for home oxygen requirement, poor PFTs.  6/16 Room air ABG  PCO2 50  PO2 53.  MARGARETH: Moderate MR.  Diastolic dysfunction.
ASSESSMENT  62y Male h/o CAD s/p CABG and many PCI/stents ("44" per patient with "over 50" cardiac caths), TMR by Dr Faulkner at [facility], DM, was admitted on 6/14/17 from [facility] with moderate - severe MR for MV clip evaluation.    Prehospital course: acute diastolic heart failure, renal insufficiency    Hospital course: Pulmonary consult obtained for home oxygen requirement, poor PFTs    PLAN  Pulm: Wean off supplemental oxygen. Daily CXR.   Cardio: Continue Lipitor. On Toprol Antiplatelets: ASA and Plavix continued for multiple stents/CABG with patent grafts  For MARGARETH tomorrow, then will discuss MV clip vs open MVR  GI: Tolerating diet. Continue stool softeners. Protonix for GI Prophylaxis.  Renal: Keep negative fluid balance. BID Lasix. Trend BUN/Cr. Supplement electrolytes prn.   Vasc: Lovenox/SCDs for DVT prophylaxis  Heme: Stable H/H. Trend CBC daily.   Endo: On Lantus and Humalog. Monitor glucoses  Disposition: Preop for MV clip vs open MVR  Discussed with Cardiothoracic Team at AM rounds.
ASSESSMENT  62y Male h/o CAD s/p CABG and many PCI/stents ("44" per patient with "over 50" cardiac caths), TMR by Dr Faulkner at [facility], DM, was admitted on 6/14/17 from [facility] with moderate - severe MR for MV clip evaluation.    Prehospital course: acute diastolic heart failure, renal insufficiency    Hospital course: Pulmonary consult obtained for home oxygen requirement, poor PFTs.  6/16 Room air ABG  PCO2 50  PO2 53.
Imp--ABG with compensated CO2 retention. May have some COPD but primary process is MR.  Plan--Cleared for surgery from pulm POV  May need transient postop BiPAP support.
ASSESSMENT  62y Male h/o CAD s/p CABG and many PCI/stents ("44" per patient with "over 50" cardiac caths), TMR by Dr Faulkner at [facility], DM, was admitted on 6/14/17 from [facility] with moderate - severe MR for MV clip evaluation.    Prehospital course: acute diastolic heart failure, renal insufficiency    Hospital course: Pulmonary consult obtained for home oxygen requirement, poor PFTs.  6/16 Room air ABG  PCO2 50  PO2 53.  MARGARETH: Moderate MR.  Diastolic dysfunction.

## 2017-06-19 PROBLEM — I73.9 PAD (PERIPHERAL ARTERY DISEASE): Status: RESOLVED | Noted: 2017-06-19 | Resolved: 2017-06-19

## 2017-06-19 PROBLEM — Z87.19 HISTORY OF ESOPHAGEAL REFLUX: Status: RESOLVED | Noted: 2017-06-19 | Resolved: 2017-06-19

## 2017-06-19 PROBLEM — I34.0 MITRAL REGURGITATION: Status: ACTIVE | Noted: 2017-06-19

## 2017-06-19 PROBLEM — Z86.39 HISTORY OF DIABETIC RETINOPATHY: Status: RESOLVED | Noted: 2017-06-19 | Resolved: 2017-06-19

## 2017-06-19 PROBLEM — Z87.09 HISTORY OF CHRONIC OBSTRUCTIVE LUNG DISEASE: Status: RESOLVED | Noted: 2017-06-19 | Resolved: 2017-06-19

## 2017-06-19 PROBLEM — Z87.39 HISTORY OF SPINAL STENOSIS: Status: RESOLVED | Noted: 2017-06-19 | Resolved: 2017-06-19

## 2017-06-19 PROBLEM — Z87.891 FORMER SMOKER: Status: ACTIVE | Noted: 2017-06-19

## 2017-06-19 PROBLEM — K22.70 BARRETT'S ESOPHAGUS: Status: RESOLVED | Noted: 2017-06-19 | Resolved: 2017-06-19

## 2017-06-19 PROBLEM — Z86.69 HISTORY OF CATARACT: Status: RESOLVED | Noted: 2017-06-19 | Resolved: 2017-06-19

## 2017-06-19 PROBLEM — Z86.39 HISTORY OF DIABETES MELLITUS: Status: RESOLVED | Noted: 2017-06-19 | Resolved: 2017-06-19

## 2017-06-19 PROBLEM — Z86.69 HISTORY OF SLEEP APNEA: Status: RESOLVED | Noted: 2017-06-19 | Resolved: 2017-06-19

## 2017-06-19 PROBLEM — I21.4 NSTEMI, INITIAL EPISODE OF CARE: Status: RESOLVED | Noted: 2017-06-19 | Resolved: 2017-06-19

## 2017-06-19 PROBLEM — Z86.79 HISTORY OF HYPERTENSION: Status: RESOLVED | Noted: 2017-06-19 | Resolved: 2017-06-19

## 2017-06-19 PROBLEM — N18.2 STAGE 2 CHRONIC KIDNEY DISEASE: Status: RESOLVED | Noted: 2017-06-19 | Resolved: 2017-06-19

## 2017-06-19 PROBLEM — Z86.39 HISTORY OF HYPERLIPIDEMIA: Status: RESOLVED | Noted: 2017-06-19 | Resolved: 2017-06-19

## 2017-06-19 PROBLEM — Z86.79 HISTORY OF MYOCARDIAL ISCHEMIA: Status: RESOLVED | Noted: 2017-06-19 | Resolved: 2017-06-19

## 2017-06-19 RX ORDER — PANTOPRAZOLE SODIUM 40 MG/1
40 GRANULE, DELAYED RELEASE ORAL
Refills: 0 | Status: ACTIVE | COMMUNITY

## 2017-06-19 RX ORDER — ATORVASTATIN CALCIUM 80 MG/1
80 TABLET, FILM COATED ORAL
Refills: 0 | Status: ACTIVE | COMMUNITY

## 2017-06-19 RX ORDER — TRAZODONE HYDROCHLORIDE 150 MG/1
150 TABLET ORAL
Refills: 0 | Status: ACTIVE | COMMUNITY

## 2017-06-19 RX ORDER — INSULIN LISPRO 100 [IU]/ML
INJECTION, SOLUTION INTRAVENOUS; SUBCUTANEOUS
Refills: 0 | Status: ACTIVE | COMMUNITY

## 2017-06-19 RX ORDER — CLOPIDOGREL 75 MG/1
75 TABLET, FILM COATED ORAL
Refills: 0 | Status: ACTIVE | COMMUNITY

## 2017-06-19 RX ORDER — ASPIRIN/ACETAMINOPHEN/CAFFEINE 500-325-65
325 POWDER IN PACKET (EA) ORAL
Refills: 0 | Status: ACTIVE | COMMUNITY

## 2017-06-19 RX ORDER — INSULIN GLARGINE 100 [IU]/ML
100 INJECTION, SOLUTION SUBCUTANEOUS
Refills: 0 | Status: ACTIVE | COMMUNITY

## 2017-06-19 RX ORDER — TORSEMIDE 20 MG/1
20 TABLET ORAL
Refills: 0 | Status: ACTIVE | COMMUNITY

## 2017-06-19 RX ORDER — TIOTROPIUM BROMIDE 18 UG/1
18 CAPSULE ORAL; RESPIRATORY (INHALATION)
Refills: 0 | Status: ACTIVE | COMMUNITY

## 2017-06-19 RX ORDER — GABAPENTIN 400 MG/1
400 CAPSULE ORAL
Refills: 0 | Status: ACTIVE | COMMUNITY

## 2017-06-19 RX ORDER — SPIRONOLACTONE 25 MG/1
25 TABLET, FILM COATED ORAL
Refills: 0 | Status: ACTIVE | COMMUNITY

## 2017-06-19 RX ORDER — MOMETASONE FUROATE MONOHYDRATE 50 UG/1
SPRAY, METERED NASAL
Refills: 0 | Status: ACTIVE | COMMUNITY

## 2017-06-19 RX ORDER — LORAZEPAM 2 MG/1
2 TABLET ORAL
Refills: 0 | Status: ACTIVE | COMMUNITY

## 2017-06-19 RX ORDER — FLUTICASONE PROPIONATE 50 MCG
50 SPRAY, SUSPENSION NASAL
Refills: 0 | Status: ACTIVE | COMMUNITY

## 2017-06-19 RX ORDER — CITALOPRAM 40 MG/1
40 TABLET, FILM COATED ORAL
Refills: 0 | Status: ACTIVE | COMMUNITY

## 2017-06-19 RX ORDER — TAMSULOSIN HCL 0.4 MG
0.4 CAPSULE ORAL
Refills: 0 | Status: ACTIVE | COMMUNITY

## 2017-06-30 ENCOUNTER — OUTPATIENT (OUTPATIENT)
Dept: OUTPATIENT SERVICES | Facility: HOSPITAL | Age: 62
LOS: 1 days | End: 2017-06-30
Payer: MEDICARE

## 2017-06-30 VITALS
RESPIRATION RATE: 16 BRPM | HEIGHT: 65 IN | SYSTOLIC BLOOD PRESSURE: 121 MMHG | DIASTOLIC BLOOD PRESSURE: 72 MMHG | TEMPERATURE: 98 F | HEART RATE: 59 BPM | WEIGHT: 191.8 LBS

## 2017-06-30 DIAGNOSIS — Z89.9 ACQUIRED ABSENCE OF LIMB, UNSPECIFIED: Chronic | ICD-10-CM

## 2017-06-30 DIAGNOSIS — Z01.818 ENCOUNTER FOR OTHER PREPROCEDURAL EXAMINATION: ICD-10-CM

## 2017-06-30 DIAGNOSIS — Z98.890 OTHER SPECIFIED POSTPROCEDURAL STATES: Chronic | ICD-10-CM

## 2017-06-30 DIAGNOSIS — T82.897D OTHER SPECIFIED COMPLICATION OF CARDIAC PROSTHETIC DEVICES, IMPLANTS AND GRAFTS, SUBSEQUENT ENCOUNTER: Chronic | ICD-10-CM

## 2017-06-30 DIAGNOSIS — I34.0 NONRHEUMATIC MITRAL (VALVE) INSUFFICIENCY: ICD-10-CM

## 2017-06-30 LAB
ALBUMIN SERPL ELPH-MCNC: 4.1 G/DL — SIGNIFICANT CHANGE UP (ref 3.3–5.2)
ALP SERPL-CCNC: 98 U/L — SIGNIFICANT CHANGE UP (ref 40–120)
ALT FLD-CCNC: 17 U/L — SIGNIFICANT CHANGE UP
ANION GAP SERPL CALC-SCNC: 14 MMOL/L — SIGNIFICANT CHANGE UP (ref 5–17)
APPEARANCE UR: CLEAR — SIGNIFICANT CHANGE UP
APTT BLD: 31.4 SEC — SIGNIFICANT CHANGE UP (ref 27.5–37.4)
AST SERPL-CCNC: 21 U/L — SIGNIFICANT CHANGE UP
BACTERIA # UR AUTO: ABNORMAL
BASOPHILS # BLD AUTO: 0 K/UL — SIGNIFICANT CHANGE UP (ref 0–0.2)
BASOPHILS NFR BLD AUTO: 0.1 % — SIGNIFICANT CHANGE UP (ref 0–2)
BILIRUB DIRECT SERPL-MCNC: 0.1 MG/DL — SIGNIFICANT CHANGE UP (ref 0–0.3)
BILIRUB INDIRECT FLD-MCNC: 0.3 MG/DL — SIGNIFICANT CHANGE UP (ref 0.2–1)
BILIRUB SERPL-MCNC: 0.4 MG/DL — SIGNIFICANT CHANGE UP (ref 0.4–2)
BILIRUB UR-MCNC: NEGATIVE — SIGNIFICANT CHANGE UP
BLD GP AB SCN SERPL QL: SIGNIFICANT CHANGE UP
BUN SERPL-MCNC: 52 MG/DL — HIGH (ref 8–20)
CALCIUM SERPL-MCNC: 9.4 MG/DL — SIGNIFICANT CHANGE UP (ref 8.6–10.2)
CHLORIDE SERPL-SCNC: 100 MMOL/L — SIGNIFICANT CHANGE UP (ref 98–107)
CO2 SERPL-SCNC: 25 MMOL/L — SIGNIFICANT CHANGE UP (ref 22–29)
COLOR SPEC: YELLOW — SIGNIFICANT CHANGE UP
COMMENT - URINE: SIGNIFICANT CHANGE UP
CREAT SERPL-MCNC: 1.67 MG/DL — HIGH (ref 0.5–1.3)
DIFF PNL FLD: NEGATIVE — SIGNIFICANT CHANGE UP
EOSINOPHIL # BLD AUTO: 0.1 K/UL — SIGNIFICANT CHANGE UP (ref 0–0.5)
EOSINOPHIL NFR BLD AUTO: 0.6 % — SIGNIFICANT CHANGE UP (ref 0–5)
EPI CELLS # UR: SIGNIFICANT CHANGE UP
GLUCOSE SERPL-MCNC: 187 MG/DL — HIGH (ref 70–115)
GLUCOSE UR QL: NEGATIVE MG/DL — SIGNIFICANT CHANGE UP
HBA1C BLD-MCNC: 7.4 % — HIGH (ref 4–5.6)
HCT VFR BLD CALC: 40.1 % — LOW (ref 42–52)
HGB BLD-MCNC: 13.2 G/DL — LOW (ref 14–18)
INR BLD: 1.03 RATIO — SIGNIFICANT CHANGE UP (ref 0.88–1.16)
KETONES UR-MCNC: NEGATIVE — SIGNIFICANT CHANGE UP
LEUKOCYTE ESTERASE UR-ACNC: ABNORMAL
LYMPHOCYTES # BLD AUTO: 1 K/UL — SIGNIFICANT CHANGE UP (ref 1–4.8)
LYMPHOCYTES # BLD AUTO: 7.1 % — LOW (ref 20–55)
MCHC RBC-ENTMCNC: 28.1 PG — SIGNIFICANT CHANGE UP (ref 27–31)
MCHC RBC-ENTMCNC: 32.9 G/DL — SIGNIFICANT CHANGE UP (ref 32–36)
MCV RBC AUTO: 85.3 FL — SIGNIFICANT CHANGE UP (ref 80–94)
MONOCYTES # BLD AUTO: 0.8 K/UL — SIGNIFICANT CHANGE UP (ref 0–0.8)
MONOCYTES NFR BLD AUTO: 6 % — SIGNIFICANT CHANGE UP (ref 3–10)
MRSA PCR RESULT.: SIGNIFICANT CHANGE UP
NEUTROPHILS # BLD AUTO: 12.3 K/UL — HIGH (ref 1.8–8)
NEUTROPHILS NFR BLD AUTO: 85.9 % — HIGH (ref 37–73)
NITRITE UR-MCNC: NEGATIVE — SIGNIFICANT CHANGE UP
NT-PROBNP SERPL-SCNC: 950 PG/ML — HIGH (ref 0–300)
PH UR: 6 — SIGNIFICANT CHANGE UP (ref 5–8)
PLATELET # BLD AUTO: 240 K/UL — SIGNIFICANT CHANGE UP (ref 150–400)
POTASSIUM SERPL-MCNC: 5.3 MMOL/L — SIGNIFICANT CHANGE UP (ref 3.5–5.3)
POTASSIUM SERPL-SCNC: 5.3 MMOL/L — SIGNIFICANT CHANGE UP (ref 3.5–5.3)
PREALB SERPL-MCNC: 23 MG/DL — SIGNIFICANT CHANGE UP (ref 18–38)
PROT SERPL-MCNC: 7.7 G/DL — SIGNIFICANT CHANGE UP (ref 6.6–8.7)
PROT UR-MCNC: NEGATIVE MG/DL — SIGNIFICANT CHANGE UP
PROTHROM AB SERPL-ACNC: 11.3 SEC — SIGNIFICANT CHANGE UP (ref 9.8–12.7)
RBC # BLD: 4.7 M/UL — SIGNIFICANT CHANGE UP (ref 4.6–6.2)
RBC # FLD: 17 % — HIGH (ref 11–15.6)
RBC CASTS # UR COMP ASSIST: SIGNIFICANT CHANGE UP /HPF (ref 0–4)
S AUREUS DNA NOSE QL NAA+PROBE: SIGNIFICANT CHANGE UP
SODIUM SERPL-SCNC: 139 MMOL/L — SIGNIFICANT CHANGE UP (ref 135–145)
SP GR SPEC: 1.01 — SIGNIFICANT CHANGE UP (ref 1.01–1.02)
T3 SERPL-MCNC: 91 NG/DL — SIGNIFICANT CHANGE UP (ref 80–200)
T4 AB SER-ACNC: 6.6 UG/DL — SIGNIFICANT CHANGE UP (ref 4.5–12)
TSH SERPL-MCNC: 1.33 UIU/ML — SIGNIFICANT CHANGE UP (ref 0.27–4.2)
TYPE + AB SCN PNL BLD: SIGNIFICANT CHANGE UP
UROBILINOGEN FLD QL: NEGATIVE MG/DL — SIGNIFICANT CHANGE UP
WBC # BLD: 14.3 K/UL — HIGH (ref 4.8–10.8)
WBC # FLD AUTO: 14.3 K/UL — HIGH (ref 4.8–10.8)
WBC UR QL: ABNORMAL

## 2017-06-30 PROCEDURE — 71020: CPT | Mod: 26

## 2017-06-30 PROCEDURE — 93010 ELECTROCARDIOGRAM REPORT: CPT

## 2017-06-30 RX ORDER — INSULIN ASPART 100 [IU]/ML
0 INJECTION, SOLUTION SUBCUTANEOUS
Qty: 0 | Refills: 0 | COMMUNITY

## 2017-06-30 RX ORDER — INSULIN GLARGINE 100 [IU]/ML
0 INJECTION, SOLUTION SUBCUTANEOUS
Qty: 0 | Refills: 0 | COMMUNITY

## 2017-06-30 NOTE — H&P PST ADULT - FAMILY HISTORY
Sibling  Still living? Yes, Estimated age: Age Unknown  Family history of CABG, Age at diagnosis: Age Unknown     Mother  Still living? Unknown  Family history of diabetes mellitus (DM), Age at diagnosis: Age Unknown Sibling  Still living? Yes, Estimated age: Age Unknown  Family history of CABG, Age at diagnosis: 61-70     Mother  Still living? No  Family history of diabetes mellitus (DM), Age at diagnosis: Age Unknown  Family history of acute myocardial infarction, Age at diagnosis: 51-60     Father  Still living? No  Family history of acute myocardial infarction, Age at diagnosis: 71-80  Family history of abdominal aortic aneurysm, Age at diagnosis: Age Unknown  Family history of left bundle branch block, Age at diagnosis: Age Unknown

## 2017-06-30 NOTE — H&P PST ADULT - PSH
Coronary stent occlusion, subsequent encounter    History of amputation  1/2 LLE great toe  1st and 2nd toes rt foot  History of cataract extraction    History of coronary artery bypass graft  TMR, 2001 [place]  History of transmyocardial revascularization    History of wrist fracture  left repaired Coronary stent occlusion, subsequent encounter    History of amputation  1/2 LLE great toe  1st and 2nd toes rt foot  History of cataract extraction    History of coronary artery bypass graft  TMR, 2001 [place]  History of transmyocardial revascularization    History of wrist fracture  left repaired

## 2017-06-30 NOTE — PATIENT PROFILE ADULT. - PSH
Coronary stent occlusion, subsequent encounter    History of amputation  1/2 LLE great toe  1st and 2nd toes rt foot  History of cataract extraction    History of coronary artery bypass graft  TMR, 2001 [place]  History of transmyocardial revascularization    History of wrist fracture  left repaired

## 2017-06-30 NOTE — H&P PST ADULT - PMH
Hooper's esophagus    CAD (coronary artery disease)  multiple MI, PCI, CABG/TMR  Charcot foot due to diabetes mellitus    CKD (chronic kidney disease) stage 2, GFR 60-89 ml/min    COPD (chronic obstructive pulmonary disease)    Diastolic dysfunction with acute on chronic heart failure    DM (diabetes mellitus)    Dyslipidemia    GERD (gastroesophageal reflux disease)    HLD (hyperlipidemia)    HTN (hypertension)    Hypoxia    Ischemic cardiomyopathy    Memory loss, short term    Morbid obesity    Non-rheumatic mitral regurgitation    NSTEMI (non-ST elevated myocardial infarction)    Obesity    PAD (peripheral artery disease)    Retinopathy    Sleep apnea    Spinal stenosis Hooper's esophagus    CAD (coronary artery disease)  multiple MI, PCI, CABG/TMR  Charcot foot due to diabetes mellitus    CKD (chronic kidney disease) stage 2, GFR 60-89 ml/min    COPD (chronic obstructive pulmonary disease)    Diastolic dysfunction with acute on chronic heart failure    DM (diabetes mellitus)    GERD (gastroesophageal reflux disease)    HLD (hyperlipidemia)    HTN (hypertension)    Hypoxia    Ischemic cardiomyopathy    Memory loss, short term    Morbid obesity    Non-rheumatic mitral regurgitation    NSTEMI (non-ST elevated myocardial infarction)    Obesity    PAD (peripheral artery disease)    Retinopathy    Sleep apnea    Spinal stenosis

## 2017-06-30 NOTE — H&P PST ADULT - HISTORY OF PRESENT ILLNESS
62M 62M with history of CAD and had a cardiac catheterization in April with stents placed. Discharged and recurrent "fluid build up" and admitted to [facility] for CHF. Work up showed Mitral Regurgitation. Transferred to [facility] for Mitral Clip. SOB on minimal exertion. Reports orthopnea and sleeps in a hospital bed at home with HOB raised. Denies chest pain or palpitations. 62M with history of CAD and had a cardiac catheterization in April with stents placed. Discharged and recurrent "fluid build up" and admitted to [facility] for CHF. Work up showed Mitral Regurgitation. Transferred to [facility] for Mitral Clip. SOB on minimal exertion. Reports orthopnea and sleeps in a hospital bed at home with HOB raised. Reports occasional chest pain and palpitations, not related to exertion, occurs at rest at times, patient states "I've had this for years." Now scheduled for Mitral Clip.

## 2017-06-30 NOTE — H&P PST ADULT - ASSESSMENT
62M PMH HTN, CAD with stents, CABG, CHF, Ischemic Cardiomyopathy, Non-rheumatic Mitral Regurgitation, Hyperlipidemia, PAD, Sleep Apnea, COPD, Hypoxia on home O2, Hooper's Esophagus, CKD, DM and Retinopathy for Mitral Clip.

## 2017-06-30 NOTE — H&P PST ADULT - ATTENDING COMMENTS
Patient with chronic diastolic congestive heart failure and severe mitral valve regurgitation who is high risk for open surgery. Patient is a candidate for mitral clip procedure.

## 2017-07-01 LAB
CULTURE RESULTS: NO GROWTH — SIGNIFICANT CHANGE UP
SPECIMEN SOURCE: SIGNIFICANT CHANGE UP

## 2017-07-11 PROCEDURE — 86920 COMPATIBILITY TEST SPIN: CPT

## 2017-07-11 PROCEDURE — 86850 RBC ANTIBODY SCREEN: CPT

## 2017-07-11 PROCEDURE — 84134 ASSAY OF PREALBUMIN: CPT

## 2017-07-11 PROCEDURE — 71046 X-RAY EXAM CHEST 2 VIEWS: CPT

## 2017-07-11 PROCEDURE — 85730 THROMBOPLASTIN TIME PARTIAL: CPT

## 2017-07-11 PROCEDURE — 84436 ASSAY OF TOTAL THYROXINE: CPT

## 2017-07-11 PROCEDURE — 82248 BILIRUBIN DIRECT: CPT

## 2017-07-11 PROCEDURE — 87640 STAPH A DNA AMP PROBE: CPT

## 2017-07-11 PROCEDURE — 83036 HEMOGLOBIN GLYCOSYLATED A1C: CPT

## 2017-07-11 PROCEDURE — 84480 ASSAY TRIIODOTHYRONINE (T3): CPT

## 2017-07-11 PROCEDURE — 83880 ASSAY OF NATRIURETIC PEPTIDE: CPT

## 2017-07-11 PROCEDURE — 81001 URINALYSIS AUTO W/SCOPE: CPT

## 2017-07-11 PROCEDURE — 87086 URINE CULTURE/COLONY COUNT: CPT

## 2017-07-11 PROCEDURE — G0463: CPT

## 2017-07-11 PROCEDURE — 84443 ASSAY THYROID STIM HORMONE: CPT

## 2017-07-11 PROCEDURE — 85027 COMPLETE CBC AUTOMATED: CPT

## 2017-07-11 PROCEDURE — 93005 ELECTROCARDIOGRAM TRACING: CPT

## 2017-07-11 PROCEDURE — 36415 COLL VENOUS BLD VENIPUNCTURE: CPT

## 2017-07-11 PROCEDURE — 86901 BLOOD TYPING SEROLOGIC RH(D): CPT

## 2017-07-11 PROCEDURE — 86900 BLOOD TYPING SEROLOGIC ABO: CPT

## 2017-07-11 PROCEDURE — 85610 PROTHROMBIN TIME: CPT

## 2017-07-11 PROCEDURE — 80053 COMPREHEN METABOLIC PANEL: CPT

## 2017-07-12 ENCOUNTER — INPATIENT (INPATIENT)
Facility: HOSPITAL | Age: 62
LOS: 0 days | Discharge: HOME | DRG: 228 | End: 2017-07-13
Attending: THORACIC SURGERY (CARDIOTHORACIC VASCULAR SURGERY) | Admitting: THORACIC SURGERY (CARDIOTHORACIC VASCULAR SURGERY)
Payer: MEDICARE

## 2017-07-12 ENCOUNTER — APPOINTMENT (OUTPATIENT)
Dept: CARDIOTHORACIC SURGERY | Facility: HOSPITAL | Age: 62
End: 2017-07-12
Payer: MEDICARE

## 2017-07-12 VITALS
SYSTOLIC BLOOD PRESSURE: 128 MMHG | HEART RATE: 65 BPM | OXYGEN SATURATION: 96 % | HEIGHT: 65 IN | RESPIRATION RATE: 16 BRPM | TEMPERATURE: 97 F | DIASTOLIC BLOOD PRESSURE: 80 MMHG | WEIGHT: 191.8 LBS

## 2017-07-12 DIAGNOSIS — Z89.9 ACQUIRED ABSENCE OF LIMB, UNSPECIFIED: Chronic | ICD-10-CM

## 2017-07-12 DIAGNOSIS — I34.0 NONRHEUMATIC MITRAL (VALVE) INSUFFICIENCY: ICD-10-CM

## 2017-07-12 DIAGNOSIS — T82.897D OTHER SPECIFIED COMPLICATION OF CARDIAC PROSTHETIC DEVICES, IMPLANTS AND GRAFTS, SUBSEQUENT ENCOUNTER: Chronic | ICD-10-CM

## 2017-07-12 DIAGNOSIS — Z98.890 OTHER SPECIFIED POSTPROCEDURAL STATES: Chronic | ICD-10-CM

## 2017-07-12 LAB
ALBUMIN SERPL ELPH-MCNC: 3.3 G/DL — SIGNIFICANT CHANGE UP (ref 3.3–5.2)
ALP SERPL-CCNC: 94 U/L — SIGNIFICANT CHANGE UP (ref 40–120)
ALT FLD-CCNC: 12 U/L — SIGNIFICANT CHANGE UP
ANION GAP SERPL CALC-SCNC: 14 MMOL/L — SIGNIFICANT CHANGE UP (ref 5–17)
APTT BLD: 29.3 SEC — SIGNIFICANT CHANGE UP (ref 27.5–37.4)
AST SERPL-CCNC: 12 U/L — SIGNIFICANT CHANGE UP
BILIRUB SERPL-MCNC: 0.3 MG/DL — LOW (ref 0.4–2)
BLD GP AB SCN SERPL QL: SIGNIFICANT CHANGE UP
BUN SERPL-MCNC: 34 MG/DL — HIGH (ref 8–20)
CALCIUM SERPL-MCNC: 8.6 MG/DL — SIGNIFICANT CHANGE UP (ref 8.6–10.2)
CHLORIDE SERPL-SCNC: 105 MMOL/L — SIGNIFICANT CHANGE UP (ref 98–107)
CK SERPL-CCNC: 36 U/L — SIGNIFICANT CHANGE UP (ref 30–200)
CO2 SERPL-SCNC: 22 MMOL/L — SIGNIFICANT CHANGE UP (ref 22–29)
CREAT SERPL-MCNC: 1.09 MG/DL — SIGNIFICANT CHANGE UP (ref 0.5–1.3)
GAS PNL BLDA: SIGNIFICANT CHANGE UP
GLUCOSE SERPL-MCNC: 188 MG/DL — HIGH (ref 70–115)
HCT VFR BLD CALC: 35 % — LOW (ref 42–52)
HGB BLD-MCNC: 11.6 G/DL — LOW (ref 14–18)
INR BLD: 1.15 RATIO — SIGNIFICANT CHANGE UP (ref 0.88–1.16)
MAGNESIUM SERPL-MCNC: 1.7 MG/DL — SIGNIFICANT CHANGE UP (ref 1.6–2.6)
MCHC RBC-ENTMCNC: 27.8 PG — SIGNIFICANT CHANGE UP (ref 27–31)
MCHC RBC-ENTMCNC: 33.1 G/DL — SIGNIFICANT CHANGE UP (ref 32–36)
MCV RBC AUTO: 83.7 FL — SIGNIFICANT CHANGE UP (ref 80–94)
PLATELET # BLD AUTO: 187 K/UL — SIGNIFICANT CHANGE UP (ref 150–400)
POTASSIUM SERPL-MCNC: 4.2 MMOL/L — SIGNIFICANT CHANGE UP (ref 3.5–5.3)
POTASSIUM SERPL-SCNC: 4.2 MMOL/L — SIGNIFICANT CHANGE UP (ref 3.5–5.3)
PROT SERPL-MCNC: 6.4 G/DL — LOW (ref 6.6–8.7)
PROTHROM AB SERPL-ACNC: 12.7 SEC — SIGNIFICANT CHANGE UP (ref 9.8–12.7)
RBC # BLD: 4.18 M/UL — LOW (ref 4.6–6.2)
RBC # FLD: 16.2 % — HIGH (ref 11–15.6)
SODIUM SERPL-SCNC: 141 MMOL/L — SIGNIFICANT CHANGE UP (ref 135–145)
TROPONIN T SERPL-MCNC: 0.03 NG/ML — SIGNIFICANT CHANGE UP (ref 0–0.06)
TYPE + AB SCN PNL BLD: SIGNIFICANT CHANGE UP
WBC # BLD: 8.6 K/UL — SIGNIFICANT CHANGE UP (ref 4.8–10.8)
WBC # FLD AUTO: 8.6 K/UL — SIGNIFICANT CHANGE UP (ref 4.8–10.8)

## 2017-07-12 PROCEDURE — 33418 REPAIR TCAT MITRAL VALVE: CPT | Mod: 62,Q0

## 2017-07-12 PROCEDURE — 33419 REPAIR TCAT MITRAL VALVE: CPT | Mod: Q0

## 2017-07-12 PROCEDURE — 33419 REPAIR TCAT MITRAL VALVE: CPT | Mod: AS,Q0

## 2017-07-12 PROCEDURE — 33418 REPAIR TCAT MITRAL VALVE: CPT | Mod: AS,62,Q0

## 2017-07-12 PROCEDURE — 71010: CPT | Mod: 26

## 2017-07-12 PROCEDURE — 33418 REPAIR TCAT MITRAL VALVE: CPT | Mod: Q0

## 2017-07-12 PROCEDURE — 93010 ELECTROCARDIOGRAM REPORT: CPT

## 2017-07-12 RX ORDER — SODIUM CHLORIDE 9 MG/ML
1000 INJECTION INTRAMUSCULAR; INTRAVENOUS; SUBCUTANEOUS
Qty: 0 | Refills: 0 | Status: DISCONTINUED | OUTPATIENT
Start: 2017-07-12 | End: 2017-07-13

## 2017-07-12 RX ORDER — TRAZODONE HCL 50 MG
150 TABLET ORAL DAILY
Qty: 0 | Refills: 0 | Status: DISCONTINUED | OUTPATIENT
Start: 2017-07-13 | End: 2017-07-13

## 2017-07-12 RX ORDER — DONEPEZIL HYDROCHLORIDE 10 MG/1
5 TABLET, FILM COATED ORAL AT BEDTIME
Qty: 0 | Refills: 0 | Status: DISCONTINUED | OUTPATIENT
Start: 2017-07-12 | End: 2017-07-13

## 2017-07-12 RX ORDER — SENNA PLUS 8.6 MG/1
2 TABLET ORAL AT BEDTIME
Qty: 0 | Refills: 0 | Status: DISCONTINUED | OUTPATIENT
Start: 2017-07-12 | End: 2017-07-13

## 2017-07-12 RX ORDER — DEXTROSE 50 % IN WATER 50 %
12.5 SYRINGE (ML) INTRAVENOUS ONCE
Qty: 0 | Refills: 0 | Status: DISCONTINUED | OUTPATIENT
Start: 2017-07-12 | End: 2017-07-13

## 2017-07-12 RX ORDER — CEFUROXIME AXETIL 250 MG
1500 TABLET ORAL EVERY 8 HOURS
Qty: 0 | Refills: 0 | Status: DISCONTINUED | OUTPATIENT
Start: 2017-07-13 | End: 2017-07-13

## 2017-07-12 RX ORDER — GLUCAGON INJECTION, SOLUTION 0.5 MG/.1ML
1 INJECTION, SOLUTION SUBCUTANEOUS ONCE
Qty: 0 | Refills: 0 | Status: DISCONTINUED | OUTPATIENT
Start: 2017-07-12 | End: 2017-07-12

## 2017-07-12 RX ORDER — CLOPIDOGREL BISULFATE 75 MG/1
75 TABLET, FILM COATED ORAL DAILY
Qty: 0 | Refills: 0 | Status: DISCONTINUED | OUTPATIENT
Start: 2017-07-13 | End: 2017-07-13

## 2017-07-12 RX ORDER — SODIUM CHLORIDE 9 MG/ML
1000 INJECTION, SOLUTION INTRAVENOUS
Qty: 0 | Refills: 0 | Status: DISCONTINUED | OUTPATIENT
Start: 2017-07-12 | End: 2017-07-12

## 2017-07-12 RX ORDER — LISINOPRIL 2.5 MG/1
5 TABLET ORAL DAILY
Qty: 0 | Refills: 0 | Status: DISCONTINUED | OUTPATIENT
Start: 2017-07-13 | End: 2017-07-13

## 2017-07-12 RX ORDER — DEXTROSE 50 % IN WATER 50 %
25 SYRINGE (ML) INTRAVENOUS ONCE
Qty: 0 | Refills: 0 | Status: DISCONTINUED | OUTPATIENT
Start: 2017-07-12 | End: 2017-07-12

## 2017-07-12 RX ORDER — INSULIN LISPRO 100/ML
VIAL (ML) SUBCUTANEOUS
Qty: 0 | Refills: 0 | Status: DISCONTINUED | OUTPATIENT
Start: 2017-07-12 | End: 2017-07-13

## 2017-07-12 RX ORDER — GABAPENTIN 400 MG/1
800 CAPSULE ORAL
Qty: 0 | Refills: 0 | Status: DISCONTINUED | OUTPATIENT
Start: 2017-07-12 | End: 2017-07-13

## 2017-07-12 RX ORDER — TAMSULOSIN HYDROCHLORIDE 0.4 MG/1
0.4 CAPSULE ORAL AT BEDTIME
Qty: 0 | Refills: 0 | Status: DISCONTINUED | OUTPATIENT
Start: 2017-07-12 | End: 2017-07-13

## 2017-07-12 RX ORDER — ASPIRIN/CALCIUM CARB/MAGNESIUM 324 MG
325 TABLET ORAL DAILY
Qty: 0 | Refills: 0 | Status: DISCONTINUED | OUTPATIENT
Start: 2017-07-13 | End: 2017-07-13

## 2017-07-12 RX ORDER — DOCUSATE SODIUM 100 MG
100 CAPSULE ORAL THREE TIMES A DAY
Qty: 0 | Refills: 0 | Status: DISCONTINUED | OUTPATIENT
Start: 2017-07-12 | End: 2017-07-13

## 2017-07-12 RX ORDER — MAGNESIUM SULFATE 500 MG/ML
2 VIAL (ML) INJECTION ONCE
Qty: 0 | Refills: 0 | Status: COMPLETED | OUTPATIENT
Start: 2017-07-12 | End: 2017-07-12

## 2017-07-12 RX ORDER — DEXTROSE 50 % IN WATER 50 %
25 SYRINGE (ML) INTRAVENOUS ONCE
Qty: 0 | Refills: 0 | Status: DISCONTINUED | OUTPATIENT
Start: 2017-07-12 | End: 2017-07-13

## 2017-07-12 RX ORDER — METOPROLOL TARTRATE 50 MG
1 TABLET ORAL
Qty: 0 | Refills: 0 | COMMUNITY

## 2017-07-12 RX ORDER — NICOTINE POLACRILEX 2 MG
1 GUM BUCCAL DAILY
Qty: 0 | Refills: 0 | Status: DISCONTINUED | OUTPATIENT
Start: 2017-07-13 | End: 2017-07-13

## 2017-07-12 RX ORDER — INSULIN GLARGINE 100 [IU]/ML
30 INJECTION, SOLUTION SUBCUTANEOUS
Qty: 0 | Refills: 0 | COMMUNITY

## 2017-07-12 RX ORDER — SODIUM CHLORIDE 9 MG/ML
3 INJECTION INTRAMUSCULAR; INTRAVENOUS; SUBCUTANEOUS EVERY 8 HOURS
Qty: 0 | Refills: 0 | Status: DISCONTINUED | OUTPATIENT
Start: 2017-07-12 | End: 2017-07-12

## 2017-07-12 RX ORDER — NICOTINE POLACRILEX 2 MG
1 GUM BUCCAL
Qty: 0 | Refills: 0 | COMMUNITY

## 2017-07-12 RX ORDER — LISINOPRIL 2.5 MG/1
1 TABLET ORAL
Qty: 0 | Refills: 0 | COMMUNITY

## 2017-07-12 RX ORDER — PANTOPRAZOLE SODIUM 20 MG/1
40 TABLET, DELAYED RELEASE ORAL ONCE
Qty: 0 | Refills: 0 | Status: COMPLETED | OUTPATIENT
Start: 2017-07-12 | End: 2017-07-12

## 2017-07-12 RX ORDER — INSULIN ASPART 100 [IU]/ML
0 INJECTION, SOLUTION SUBCUTANEOUS
Qty: 0 | Refills: 0 | COMMUNITY

## 2017-07-12 RX ORDER — DONEPEZIL HYDROCHLORIDE 10 MG/1
1 TABLET, FILM COATED ORAL
Qty: 0 | Refills: 0 | COMMUNITY

## 2017-07-12 RX ORDER — DEXTROSE 50 % IN WATER 50 %
12.5 SYRINGE (ML) INTRAVENOUS ONCE
Qty: 0 | Refills: 0 | Status: DISCONTINUED | OUTPATIENT
Start: 2017-07-12 | End: 2017-07-12

## 2017-07-12 RX ORDER — DEXTROSE 50 % IN WATER 50 %
1 SYRINGE (ML) INTRAVENOUS ONCE
Qty: 0 | Refills: 0 | Status: DISCONTINUED | OUTPATIENT
Start: 2017-07-12 | End: 2017-07-12

## 2017-07-12 RX ORDER — INSULIN LISPRO 100/ML
4 VIAL (ML) SUBCUTANEOUS ONCE
Qty: 0 | Refills: 0 | Status: COMPLETED | OUTPATIENT
Start: 2017-07-12 | End: 2017-07-12

## 2017-07-12 RX ORDER — TIOTROPIUM BROMIDE 18 UG/1
1 CAPSULE ORAL; RESPIRATORY (INHALATION) DAILY
Qty: 0 | Refills: 0 | Status: DISCONTINUED | OUTPATIENT
Start: 2017-07-13 | End: 2017-07-13

## 2017-07-12 RX ORDER — ISOSORBIDE MONONITRATE 60 MG/1
1 TABLET, EXTENDED RELEASE ORAL
Qty: 0 | Refills: 0 | COMMUNITY

## 2017-07-12 RX ORDER — GABAPENTIN 400 MG/1
1 CAPSULE ORAL
Qty: 0 | Refills: 0 | COMMUNITY

## 2017-07-12 RX ORDER — SPIRONOLACTONE 25 MG/1
25 TABLET, FILM COATED ORAL DAILY
Qty: 0 | Refills: 0 | Status: DISCONTINUED | OUTPATIENT
Start: 2017-07-13 | End: 2017-07-13

## 2017-07-12 RX ORDER — IPRATROPIUM/ALBUTEROL SULFATE 18-103MCG
3 AEROSOL WITH ADAPTER (GRAM) INHALATION EVERY 6 HOURS
Qty: 0 | Refills: 0 | Status: DISCONTINUED | OUTPATIENT
Start: 2017-07-12 | End: 2017-07-12

## 2017-07-12 RX ORDER — CEFUROXIME AXETIL 250 MG
1500 TABLET ORAL ONCE
Qty: 0 | Refills: 0 | Status: DISCONTINUED | OUTPATIENT
Start: 2017-07-12 | End: 2017-07-12

## 2017-07-12 RX ORDER — METOPROLOL TARTRATE 50 MG
200 TABLET ORAL DAILY
Qty: 0 | Refills: 0 | Status: DISCONTINUED | OUTPATIENT
Start: 2017-07-13 | End: 2017-07-13

## 2017-07-12 RX ORDER — ACETAMINOPHEN 500 MG
1000 TABLET ORAL ONCE
Qty: 0 | Refills: 0 | Status: COMPLETED | OUTPATIENT
Start: 2017-07-12 | End: 2017-07-12

## 2017-07-12 RX ORDER — CITALOPRAM 10 MG/1
40 TABLET, FILM COATED ORAL DAILY
Qty: 0 | Refills: 0 | Status: DISCONTINUED | OUTPATIENT
Start: 2017-07-13 | End: 2017-07-13

## 2017-07-12 RX ORDER — POTASSIUM CHLORIDE 20 MEQ
40 PACKET (EA) ORAL ONCE
Qty: 0 | Refills: 0 | Status: COMPLETED | OUTPATIENT
Start: 2017-07-12 | End: 2017-07-12

## 2017-07-12 RX ORDER — ISOSORBIDE MONONITRATE 60 MG/1
60 TABLET, EXTENDED RELEASE ORAL DAILY
Qty: 0 | Refills: 0 | Status: DISCONTINUED | OUTPATIENT
Start: 2017-07-13 | End: 2017-07-13

## 2017-07-12 RX ORDER — NITROGLYCERIN 6.5 MG
1 CAPSULE, EXTENDED RELEASE ORAL
Qty: 0 | Refills: 0 | COMMUNITY

## 2017-07-12 RX ORDER — ATORVASTATIN CALCIUM 80 MG/1
80 TABLET, FILM COATED ORAL AT BEDTIME
Qty: 0 | Refills: 0 | Status: DISCONTINUED | OUTPATIENT
Start: 2017-07-12 | End: 2017-07-13

## 2017-07-12 RX ADMIN — SENNA PLUS 2 TABLET(S): 8.6 TABLET ORAL at 21:49

## 2017-07-12 RX ADMIN — Medication 100 MILLIGRAM(S): at 21:49

## 2017-07-12 RX ADMIN — Medication 4 UNIT(S): at 13:49

## 2017-07-12 RX ADMIN — Medication 40 MILLIEQUIVALENT(S): at 21:48

## 2017-07-12 RX ADMIN — PANTOPRAZOLE SODIUM 40 MILLIGRAM(S): 20 TABLET, DELAYED RELEASE ORAL at 20:24

## 2017-07-12 RX ADMIN — DONEPEZIL HYDROCHLORIDE 5 MILLIGRAM(S): 10 TABLET, FILM COATED ORAL at 21:49

## 2017-07-12 RX ADMIN — TAMSULOSIN HYDROCHLORIDE 0.4 MILLIGRAM(S): 0.4 CAPSULE ORAL at 21:49

## 2017-07-12 RX ADMIN — ATORVASTATIN CALCIUM 80 MILLIGRAM(S): 80 TABLET, FILM COATED ORAL at 21:49

## 2017-07-12 RX ADMIN — Medication 50 GRAM(S): at 20:46

## 2017-07-12 RX ADMIN — Medication 1000 MILLIGRAM(S): at 22:45

## 2017-07-12 RX ADMIN — Medication 400 MILLIGRAM(S): at 22:11

## 2017-07-12 RX ADMIN — Medication 2: at 22:15

## 2017-07-12 NOTE — BRIEF OPERATIVE NOTE - PRE-OP DX
Chronic diastolic CHF (congestive heart failure), NYHA class 4  07/12/2017    Active  Joey Lemus  Severe mitral regurgitation  07/12/2017    Active  Joey Lemus

## 2017-07-12 NOTE — BRIEF OPERATIVE NOTE - POST-OP DX
Acute on chronic diastolic CHF (congestive heart failure), NYHA class 4  07/12/2017    Active  Joey Lemus  Severe mitral regurgitation  07/12/2017    Active  Joey Lemus

## 2017-07-12 NOTE — BRIEF OPERATIVE NOTE - PROCEDURE
Percutaneous mitral valve repair with leaflet clip implant  07/12/2017  Percutaneous Mitral Valve Repair Utilizing the Mitral Clip via Right Femoral Vein (NCT# [id number]) (STS/ACC TVT Registry ID# [id number])  Active  [id number]

## 2017-07-12 NOTE — PROGRESS NOTE ADULT - SUBJECTIVE AND OBJECTIVE BOX
Patient is enrolled in the STS/ACC TVT  Registry Mitral Module (TMVR) clinical trials number [id number]

## 2017-07-12 NOTE — PROGRESS NOTE ADULT - SUBJECTIVE AND OBJECTIVE BOX
Jlviraj Abbott Percutaneous Mitral Valve Repair Utilizing the Mitral Clip via Right Femoral Vein.  NCT# [id number], STS/ACC TVT Registry ID# [id number].

## 2017-07-13 ENCOUNTER — TRANSCRIPTION ENCOUNTER (OUTPATIENT)
Age: 62
End: 2017-07-13

## 2017-07-13 VITALS
OXYGEN SATURATION: 97 % | RESPIRATION RATE: 25 BRPM | DIASTOLIC BLOOD PRESSURE: 56 MMHG | SYSTOLIC BLOOD PRESSURE: 107 MMHG | HEART RATE: 55 BPM

## 2017-07-13 DIAGNOSIS — E11.9 TYPE 2 DIABETES MELLITUS WITHOUT COMPLICATIONS: ICD-10-CM

## 2017-07-13 DIAGNOSIS — I50.33 ACUTE ON CHRONIC DIASTOLIC (CONGESTIVE) HEART FAILURE: ICD-10-CM

## 2017-07-13 DIAGNOSIS — I10 ESSENTIAL (PRIMARY) HYPERTENSION: ICD-10-CM

## 2017-07-13 DIAGNOSIS — G47.30 SLEEP APNEA, UNSPECIFIED: ICD-10-CM

## 2017-07-13 DIAGNOSIS — I34.0 NONRHEUMATIC MITRAL (VALVE) INSUFFICIENCY: ICD-10-CM

## 2017-07-13 LAB
ALBUMIN SERPL ELPH-MCNC: 3.1 G/DL — LOW (ref 3.3–5.2)
ALP SERPL-CCNC: 89 U/L — SIGNIFICANT CHANGE UP (ref 40–120)
ALT FLD-CCNC: 9 U/L — SIGNIFICANT CHANGE UP
ANION GAP SERPL CALC-SCNC: 11 MMOL/L — SIGNIFICANT CHANGE UP (ref 5–17)
AST SERPL-CCNC: 12 U/L — SIGNIFICANT CHANGE UP
BILIRUB SERPL-MCNC: 0.3 MG/DL — LOW (ref 0.4–2)
BUN SERPL-MCNC: 32 MG/DL — HIGH (ref 8–20)
CALCIUM SERPL-MCNC: 8.7 MG/DL — SIGNIFICANT CHANGE UP (ref 8.6–10.2)
CHLORIDE SERPL-SCNC: 103 MMOL/L — SIGNIFICANT CHANGE UP (ref 98–107)
CK SERPL-CCNC: 30 U/L — SIGNIFICANT CHANGE UP (ref 30–200)
CO2 SERPL-SCNC: 23 MMOL/L — SIGNIFICANT CHANGE UP (ref 22–29)
CREAT SERPL-MCNC: 1.29 MG/DL — SIGNIFICANT CHANGE UP (ref 0.5–1.3)
GLUCOSE SERPL-MCNC: 140 MG/DL — HIGH (ref 70–115)
HCT VFR BLD CALC: 33.4 % — LOW (ref 42–52)
HGB BLD-MCNC: 11.1 G/DL — LOW (ref 14–18)
MAGNESIUM SERPL-MCNC: 2 MG/DL — SIGNIFICANT CHANGE UP (ref 1.6–2.6)
MCHC RBC-ENTMCNC: 27.5 PG — SIGNIFICANT CHANGE UP (ref 27–31)
MCHC RBC-ENTMCNC: 33.2 G/DL — SIGNIFICANT CHANGE UP (ref 32–36)
MCV RBC AUTO: 82.9 FL — SIGNIFICANT CHANGE UP (ref 80–94)
PLATELET # BLD AUTO: 190 K/UL — SIGNIFICANT CHANGE UP (ref 150–400)
POTASSIUM SERPL-MCNC: 4.8 MMOL/L — SIGNIFICANT CHANGE UP (ref 3.5–5.3)
POTASSIUM SERPL-SCNC: 4.8 MMOL/L — SIGNIFICANT CHANGE UP (ref 3.5–5.3)
PROT SERPL-MCNC: 6.2 G/DL — LOW (ref 6.6–8.7)
RBC # BLD: 4.03 M/UL — LOW (ref 4.6–6.2)
RBC # FLD: 16.3 % — HIGH (ref 11–15.6)
SODIUM SERPL-SCNC: 137 MMOL/L — SIGNIFICANT CHANGE UP (ref 135–145)
TROPONIN T SERPL-MCNC: 0.1 NG/ML — HIGH (ref 0–0.06)
WBC # BLD: 9 K/UL — SIGNIFICANT CHANGE UP (ref 4.8–10.8)
WBC # FLD AUTO: 9 K/UL — SIGNIFICANT CHANGE UP (ref 4.8–10.8)

## 2017-07-13 PROCEDURE — 71010: CPT | Mod: 26

## 2017-07-13 PROCEDURE — 93010 ELECTROCARDIOGRAM REPORT: CPT

## 2017-07-13 PROCEDURE — 93306 TTE W/DOPPLER COMPLETE: CPT | Mod: 26

## 2017-07-13 RX ADMIN — TIOTROPIUM BROMIDE 1 CAPSULE(S): 18 CAPSULE ORAL; RESPIRATORY (INHALATION) at 08:38

## 2017-07-13 RX ADMIN — Medication 100 MILLIGRAM(S): at 05:05

## 2017-07-13 RX ADMIN — Medication 8: at 12:13

## 2017-07-13 RX ADMIN — Medication 2: at 08:31

## 2017-07-13 RX ADMIN — Medication 100 MILLIGRAM(S): at 00:57

## 2017-07-13 RX ADMIN — CLOPIDOGREL BISULFATE 75 MILLIGRAM(S): 75 TABLET, FILM COATED ORAL at 12:13

## 2017-07-13 RX ADMIN — Medication 50 MILLIGRAM(S): at 05:06

## 2017-07-13 RX ADMIN — Medication 200 MILLIGRAM(S): at 05:05

## 2017-07-13 RX ADMIN — SPIRONOLACTONE 25 MILLIGRAM(S): 25 TABLET, FILM COATED ORAL at 05:05

## 2017-07-13 RX ADMIN — LISINOPRIL 5 MILLIGRAM(S): 2.5 TABLET ORAL at 05:06

## 2017-07-13 RX ADMIN — CITALOPRAM 40 MILLIGRAM(S): 10 TABLET, FILM COATED ORAL at 12:13

## 2017-07-13 RX ADMIN — Medication 325 MILLIGRAM(S): at 12:13

## 2017-07-13 RX ADMIN — GABAPENTIN 800 MILLIGRAM(S): 400 CAPSULE ORAL at 05:06

## 2017-07-13 RX ADMIN — Medication 100 MILLIGRAM(S): at 08:31

## 2017-07-13 NOTE — DISCHARGE NOTE ADULT - ADDITIONAL INSTRUCTIONS
Please call the Cardiothoracic Surgery office at [phone] if you are experiencing any shortness of breath, chest pain, fevers or chills, drainage from the incisions, persistent nausea, vomiting or if you have any questions about your medications. If the symptoms are severe, call 911 and go to the nearest hospital.   Please follow up with Dr. Vu on _________________________ . Please call the Cardiothoracic Surgery office at [phone] if you are experiencing any shortness of breath, chest pain, fevers or chills, drainage from the incisions, persistent nausea, vomiting or if you have any questions about your medications. If the symptoms are severe, call 911 and go to the nearest hospital.   Please follow up with Dr. Vu on August 1, 2017 @ 2:30 pm.

## 2017-07-13 NOTE — PROGRESS NOTE ADULT - ASSESSMENT
62 year old male with PMH Sev MR, Chronic Diastolic CHF, CAD s/p CABG/TMR & Multiple PCI (last 4/27/17), HTN, HLD, Mild Pulm HTN, Former Smoker (30 p/y, quit 2015), Emphysema on CTA (home O2 x 2 months), KALEY (? CPAP), CKD, Obesity, DM (A1C 7.4), Diabetic Retinopathy, PVD, Peripheral Neuropathy, Charcot Foot, Multiple Amputations, GERD, Barretts Esophagus, Spinal Stenosis, L-Wrist fx, Depression, Cataract Surgery. 7/12 s/p mitral clip with pulmonary vascular congestion likely due to acute on chronic diastolic heart failure on post-op CXR for which he was diuresed.

## 2017-07-13 NOTE — PROGRESS NOTE ADULT - PROBLEM SELECTOR PLAN 1
s/p mitral clip  neurologically intact  resume pre-op medication regimen  resume aspirin and plavix today  predischarge TTE  PT eval  if remains stable and TTE is okay pt can likely be discharged home later today

## 2017-07-13 NOTE — DISCHARGE NOTE ADULT - CARE PLAN
Principal Discharge DX:	Non-rheumatic mitral regurgitation  Goal:	Recover from surgery.  Instructions for follow-up, activity and diet:	Please follow up with Dr. Vu on ______________________________ .  The cardiac surgery office is on the second floor of [facility].   Take the [place] ("G") elevators to 2 and make a left.   Walk down to the second hallway on your left (before the double doors).   Sign says Cardiovascular and Thoracic Surgery.  The waiting room is on your left.   Please follow up with your Cardiologist and Primary Care Physician 2-4 weeks from discharge.   1. Take ALL of your medications as ordered. Fill your prescriptions the day you are discharged and take according to the schedule you were given. Continue to take a stool softener if you are taking narcotic pain medications. AVOID medications such as ibuprofen or naproxen if you have had bypass surgery. If you have any questions or are unable to fill the prescriptions, please call the office right away at [phone].  2. Shower daily. Clean all incisions daily while showering with warm water and mild soap, pat dry with a clean towel and do not cover with any dressings unless instructed to. No bathing, swimming in a pool or the ocean until instructed by MD.  DO NOT use creams or lotions on the wound.  3. We advise that you do not drive until instructed by MD.   4. You may not return to work until instructed by MD.   5. Please eat a low fat, low cholesterol, low salt diet. (No added/extra salt)  6. Weigh yourself every day in the morning and record it in the weight log in your red folder. Notify the office of any weight gain more than 2-3 pounds in 24 hours.  7. Continue breathing exercises several times a day.   8. No heavy lifting nothing greater than 5 pounds until cleared by MD.   9. Call / Notify MD any fever greater than 101.0, any drainage from incisions or if they become red, hot or very tender to the touch.  10. Increase activity as tolerated. Walk indoors and/or outdoors at least 3 times a day.  Secondary Diagnosis:	Acute on chronic diastolic heart failure Principal Discharge DX:	Non-rheumatic mitral regurgitation  Goal:	Recover from surgery.  Instructions for follow-up, activity and diet:	Please follow up with Dr. Vu on August 1, 2017 @ 2:30 pm.   The cardiac surgery office is on the second floor of [facility].   Take the [place] ("G") elevators to 2 and make a left.   Walk down to the second hallway on your left (before the double doors).   Sign says Cardiovascular and Thoracic Surgery.  The waiting room is on your left.   Please follow up with your Cardiologist and Primary Care Physician 2-4 weeks from discharge.   1. Take ALL of your medications as ordered. Fill your prescriptions the day you are discharged and take according to the schedule you were given. Continue to take a stool softener if you are taking narcotic pain medications. AVOID medications such as ibuprofen or naproxen if you have had bypass surgery. If you have any questions or are unable to fill the prescriptions, please call the office right away at [phone].  2. Shower daily. Clean all incisions daily while showering with warm water and mild soap, pat dry with a clean towel and do not cover with any dressings unless instructed to. No bathing, swimming in a pool or the ocean until instructed by MD.  DO NOT use creams or lotions on the wound.  3. We advise that you do not drive until instructed by MD.   4. You may not return to work until instructed by MD.   5. Please eat a low fat, low cholesterol, low salt diet. (No added/extra salt)  6. Weigh yourself every day in the morning and record it in the weight log in your red folder. Notify the office of any weight gain more than 2-3 pounds in 24 hours.  7. Continue breathing exercises several times a day.   8. No heavy lifting nothing greater than 5 pounds until cleared by MD.   9. Call / Notify MD any fever greater than 101.0, any drainage from incisions or if they become red, hot or very tender to the touch.  10. Increase activity as tolerated. Walk indoors and/or outdoors at least 3 times a day.  Secondary Diagnosis:	Acute on chronic diastolic heart failure Principal Discharge DX:	Non-rheumatic mitral regurgitation  Goal:	Recover from surgery.  Instructions for follow-up, activity and diet:	Please follow up with Dr. Vu on August 1, 2017 @ 2:30 pm.   The cardiac surgery office is on the second floor of [facility].   Take the [place] ("G") elevators to 2 and make a left.   Walk down to the second hallway on your left (before the double doors).   Sign says Cardiovascular and Thoracic Surgery.  The waiting room is on your left.   Please follow up with your Cardiologist and Primary Care Physician 2-4 weeks from discharge.   1. Take ALL of your medications as ordered. Fill your prescriptions the day you are discharged and take according to the schedule you were given. Continue to take a stool softener if you are taking narcotic pain medications. AVOID medications such as ibuprofen or naproxen if you have had bypass surgery. If you have any questions or are unable to fill the prescriptions, please call the office right away at [phone].  2. Shower daily. Clean all incisions daily while showering with warm water and mild soap, pat dry with a clean towel and do not cover with any dressings unless instructed to. No bathing, swimming in a pool or the ocean until instructed by MD.  DO NOT use creams or lotions on the wound.  3. We advise that you do not drive until instructed by MD.   4. You may not return to work until instructed by MD.   5. Please eat a low fat, low cholesterol, low salt diet. (No added/extra salt)  6. Weigh yourself every day in the morning and record it in the weight log in your red folder. Notify the office of any weight gain more than 2-3 pounds in 24 hours.  7. Continue breathing exercises several times a day.   8. No heavy lifting nothing greater than 5 pounds until cleared by MD.   9. Call / Notify MD any fever greater than 101.0, any drainage from incisions or if they become red, hot or very tender to the touch.  10. Increase activity as tolerated. Walk indoors and/or outdoors at least 3 times a day.  Secondary Diagnosis:	Acute on chronic diastolic heart failure Principal Discharge DX:	Non-rheumatic mitral regurgitation  Goal:	Recover from surgery.  Instructions for follow-up, activity and diet:	Please follow up with Dr. Vu on August 1, 2017 @ 2:30 pm.   The cardiac surgery office is on the second floor of [facility].   Take the [place] ("G") elevators to 2 and make a left.   Walk down to the second hallway on your left (before the double doors).   Sign says Cardiovascular and Thoracic Surgery.  The waiting room is on your left.   Please follow up with your Cardiologist and Primary Care Physician 2-4 weeks from discharge.   1. Take ALL of your medications as ordered. Fill your prescriptions the day you are discharged and take according to the schedule you were given. Continue to take a stool softener if you are taking narcotic pain medications. AVOID medications such as ibuprofen or naproxen if you have had bypass surgery. If you have any questions or are unable to fill the prescriptions, please call the office right away at [phone].  2. Shower daily. Clean all incisions daily while showering with warm water and mild soap, pat dry with a clean towel and do not cover with any dressings unless instructed to. No bathing, swimming in a pool or the ocean until instructed by MD.  DO NOT use creams or lotions on the wound.  3. We advise that you do not drive until instructed by MD.   4. You may not return to work until instructed by MD.   5. Please eat a low fat, low cholesterol, low salt diet. (No added/extra salt)  6. Weigh yourself every day in the morning and record it in the weight log in your red folder. Notify the office of any weight gain more than 2-3 pounds in 24 hours.  7. Continue breathing exercises several times a day.   8. No heavy lifting nothing greater than 5 pounds until cleared by MD.   9. Call / Notify MD any fever greater than 101.0, any drainage from incisions or if they become red, hot or very tender to the touch.  10. Increase activity as tolerated. Walk indoors and/or outdoors at least 3 times a day.  Secondary Diagnosis:	Acute on chronic diastolic heart failure

## 2017-07-13 NOTE — DISCHARGE NOTE ADULT - PLAN OF CARE
Recover from surgery. Please follow up with Dr. Vu on ______________________________ .  The cardiac surgery office is on the second floor of [facility].   Take the [place] ("G") elevators to 2 and make a left.   Walk down to the second hallway on your left (before the double doors).   Sign says Cardiovascular and Thoracic Surgery.  The waiting room is on your left.   Please follow up with your Cardiologist and Primary Care Physician 2-4 weeks from discharge.   1. Take ALL of your medications as ordered. Fill your prescriptions the day you are discharged and take according to the schedule you were given. Continue to take a stool softener if you are taking narcotic pain medications. AVOID medications such as ibuprofen or naproxen if you have had bypass surgery. If you have any questions or are unable to fill the prescriptions, please call the office right away at [phone].  2. Shower daily. Clean all incisions daily while showering with warm water and mild soap, pat dry with a clean towel and do not cover with any dressings unless instructed to. No bathing, swimming in a pool or the ocean until instructed by MD.  DO NOT use creams or lotions on the wound.  3. We advise that you do not drive until instructed by MD.   4. You may not return to work until instructed by MD.   5. Please eat a low fat, low cholesterol, low salt diet. (No added/extra salt)  6. Weigh yourself every day in the morning and record it in the weight log in your red folder. Notify the office of any weight gain more than 2-3 pounds in 24 hours.  7. Continue breathing exercises several times a day.   8. No heavy lifting nothing greater than 5 pounds until cleared by MD.   9. Call / Notify MD any fever greater than 101.0, any drainage from incisions or if they become red, hot or very tender to the touch.  10. Increase activity as tolerated. Walk indoors and/or outdoors at least 3 times a day. Please follow up with Dr. Vu on August 1, 2017 @ 2:30 pm.   The cardiac surgery office is on the second floor of [facility].   Take the [place] ("G") elevators to 2 and make a left.   Walk down to the second hallway on your left (before the double doors).   Sign says Cardiovascular and Thoracic Surgery.  The waiting room is on your left.   Please follow up with your Cardiologist and Primary Care Physician 2-4 weeks from discharge.   1. Take ALL of your medications as ordered. Fill your prescriptions the day you are discharged and take according to the schedule you were given. Continue to take a stool softener if you are taking narcotic pain medications. AVOID medications such as ibuprofen or naproxen if you have had bypass surgery. If you have any questions or are unable to fill the prescriptions, please call the office right away at [phone].  2. Shower daily. Clean all incisions daily while showering with warm water and mild soap, pat dry with a clean towel and do not cover with any dressings unless instructed to. No bathing, swimming in a pool or the ocean until instructed by MD.  DO NOT use creams or lotions on the wound.  3. We advise that you do not drive until instructed by MD.   4. You may not return to work until instructed by MD.   5. Please eat a low fat, low cholesterol, low salt diet. (No added/extra salt)  6. Weigh yourself every day in the morning and record it in the weight log in your red folder. Notify the office of any weight gain more than 2-3 pounds in 24 hours.  7. Continue breathing exercises several times a day.   8. No heavy lifting nothing greater than 5 pounds until cleared by MD.   9. Call / Notify MD any fever greater than 101.0, any drainage from incisions or if they become red, hot or very tender to the touch.  10. Increase activity as tolerated. Walk indoors and/or outdoors at least 3 times a day.

## 2017-07-13 NOTE — DISCHARGE NOTE ADULT - PATIENT PORTAL LINK FT
“You can access the Follow[employer] Patient Portal, offered by [facility], by registering with the following website: [email or web address]”

## 2017-07-13 NOTE — DISCHARGE NOTE ADULT - CARE PROVIDER_API CALL
Nate Vu), Surgery; Thoracic and Cardiac Surgery  [address] [place] [address]  Phone: [phone]  Fax: [phone]

## 2017-07-13 NOTE — PROGRESS NOTE ADULT - SUBJECTIVE AND OBJECTIVE BOX
Subjective: groins are sore otherwise without c/o. Denies CP, SOB, palpitations, dizziness, N/V, other c/o.    T(C): 36.7 (07-13-17 @ 00:00), Max: 36.7 (07-13-17 @ 00:00)  HR: 58 (07-13-17 @ 04:00) (51 - 67), SB  BP: 114/54 (07-13-17 @ 04:00) (112/56 - 128/80)  ABP: 103/37 (07-13-17 @ 03:00) (96/36 - 139/59)  ABP(mean): 58 (07-13-17 @ 03:00) (56 - 88)  RR: 20 (07-13-17 @ 04:00) (15 - 22)  SpO2: 98% (07-13-17 @ 04:00) (95% - 100%) on 3L NC      I&O's Detail    12 Jul 2017 07:01  -  13 Jul 2017 04:51  --------------------------------------------------------  IN:    Oral Fluid: 1230 mL    Solution: 100 mL    Solution: 50 mL    Solution: 50 mL  Total IN: 1430 mL    OUT:    Indwelling Catheter - Urethral: 1360 mL  Total OUT: 1360 mL    Total NET: 70 mL      07-12    141  |  105  |  34.0<H>  ----------------------------<  188<H>  4.2   |  22.0  |  1.09    Ca    8.6      12 Jul 2017 19:26  Mg     1.7     07-12    TPro  6.4<L>  /  Alb  3.3  /  TBili  0.3<L>  /  DBili  x   /  AST  12  /  ALT  12  /  AlkPhos  94  07-12                          11.1   9.0   )-----------( 190      ( 13 Jul 2017 04:18 )             33.4     PT/INR - ( 12 Jul 2017 19:26 )   PT: 12.7 sec;   INR: 1.15 ratio    PTT - ( 12 Jul 2017 19:26 )  PTT:29.3 sec    ABG - ( 12 Jul 2017 19:23 )  pH: 7.32  /  pCO2: 46    /  pO2: 82    / HCO3: 22    / Base Excess: -2.5  /  SaO2: 97        CARDIAC MARKERS ( 12 Jul 2017 19:26 )  CK36 U/L / CKMBx     / Troponin T0.03 ng/mL /      CAPILLARY BLOOD GLUCOSE  181 (12 Jul 2017 14:32)  314 (12 Jul 2017 10:50)      < from: Xray Chest 1 View AP/PA. (07.12.17 @ 19:33) >  IMPRESSION:   New consolidation in the right upper lung, suspicious for pneumonia.  < end of copied text >    7/13/17 CXR: pending      MEDICATIONS  (STANDING):  docusate sodium 100 milliGRAM(s) Oral three times a day  cefuroxime  IVPB 1500 milliGRAM(s) IV Intermittent every 8 hours  spironolactone 25 milliGRAM(s) Oral daily  aspirin 325 milliGRAM(s) Oral daily  lisinopril 5 milliGRAM(s) Oral daily  tamsulosin 0.4 milliGRAM(s) Oral at bedtime  isosorbide   mononitrate ER Tablet (IMDUR) 60 milliGRAM(s) Oral daily  gabapentin 800 milliGRAM(s) Oral two times a day  citalopram 40 milliGRAM(s) Oral daily  traZODone 150 milliGRAM(s) Oral daily  atorvastatin 80 milliGRAM(s) Oral at bedtime  clopidogrel Tablet 75 milliGRAM(s) Oral daily  metoprolol succinate  milliGRAM(s) Oral daily  tiotropium 18 MICROgram(s) Capsule 1 Capsule(s) Inhalation daily  donepezil 5 milliGRAM(s) Oral at bedtime  torsemide 50 milliGRAM(s) Oral daily  senna 2 Tablet(s) Oral at bedtime  nicotine - 21 mG/24Hr(s) Patch 1 patch Transdermal daily  insulin lispro (HumaLOG) corrective regimen sliding scale   SubCutaneous Before meals and at bedtime      Physical Exam  Neuro: A+O x 3, non-focal, speech clear and intact  Pulm: CTA, equal bilaterally  CV: RRR, +S1S2  Abd: soft, NT, ND, +BS  Ext: YE x 4, no edema  Inc: right groin C/D/I/soft with dressing, no hematoma. left groin C/D/I/soft with dressing, no hematoma, + ecchymosis

## 2017-07-13 NOTE — DISCHARGE NOTE ADULT - NS AS ACTIVITY OBS
Showering allowed/Do not make important decisions/No Heavy lifting/straining/Do not drive or operate machinery/Walking-Indoors allowed/Walking-Outdoors allowed/Stairs allowed

## 2017-07-13 NOTE — DISCHARGE NOTE ADULT - MEDICATION SUMMARY - MEDICATIONS TO TAKE
I will START or STAY ON the medications listed below when I get home from the hospital:    spironolactone 25 mg oral tablet  -- 1 tab(s) by mouth once a day  -- Indication: For Diuretic    aspirin 325 mg oral tablet  -- 1 tab(s) by mouth once a day  -- Indication: For Coronary Disease    lisinopril 5 mg oral tablet  -- 1 tab(s) by mouth once a day  -- Indication: For Hypertension    tamsulosin 0.4 mg oral capsule  -- 1 cap(s) by mouth once a day (at bedtime)  -- Indication: For BPH    nitroglycerin 0.4 mg sublingual tablet  -- 1 tab(s) under tongue every 5 minutes, As Needed - for chest pain  -- Indication: For Chest pain     isosorbide mononitrate 60 mg oral tablet, extended release  -- 1 tab(s) by mouth once a day (in the morning)  -- Indication: For Coronary disease    gabapentin 800 mg oral tablet  -- 1 tab(s) by mouth 2 times a day  -- Indication: For Neuropathy    lorazepam 2 mg oral tablet  -- 1 tab(s) by mouth once a day (at bedtime)  -- Indication: For Insomnia    citalopram 40 mg oral tablet  -- 1 tab(s) by mouth once a day  -- Indication: For Antidepressant    trazodone 150 mg oral tablet  -- 1 tab(s) by mouth once a day (at bedtime)  -- Indication: For Antidepressant    Toujeo SoloStar 300 units/mL subcutaneous solution  -- 30 unit(s) subcutaneous 2 times a day  -- Indication: For DM (diabetes mellitus)    NovoLOG FlexPen 100 units/mL subcutaneous solution  --  subcutaneous 3 times a day, 30 units QAM, 15 units at lunch, 30 units with dinner  -- Indication: For DM (diabetes mellitus)    Lipitor 80 mg oral tablet  -- 1 tab(s) by mouth once a day (at bedtime)  -- Indication: For Cholestorol     Plavix 75 mg oral tablet  -- 1 tab(s) by mouth once a day  -- Indication: For Mitral clip/Coronary disease    Metoprolol Succinate  mg oral tablet, extended release  -- 1 tab(s) by mouth once a day  -- Indication: For CHF    Spiriva 18 mcg inhalation capsule  -- 1 cap(s) inhaled once a day  -- Indication: For Emphysema    donepezil 5 mg oral tablet  -- 1 tab(s) by mouth once a day (at bedtime)  -- Indication: For Alzheimer     torsemide 20 mg oral tablet  -- 2 tab(s) by mouth once a day  -- Indication: For Diuretic    torsemide 10 mg oral tablet  -- 1 tab(s) by mouth once a day (for a total of 50mg Torsemide daily)  -- Indication: For Diuretic    senna oral tablet  -- 2 tab(s) by mouth once a day (at bedtime)  -- Indication: For Constipation    docusate sodium 100 mg oral capsule  -- 1 cap(s) by mouth 2 times a day  -- Indication: For Constipation     fluticasone nasal 50 mcg/inh nasal spray  -- 2 spray(s) into nose once a day, As Needed - for allergy symptoms  -- Indication: For Emphysema    Protonix 40 mg oral delayed release tablet  -- 1 tab(s) by mouth once a day  -- Indication: For GERD    nicotine 21 mg/24 hr transdermal film, extended release  -- 1 patch by transdermal patch once a day  -- Indication: For Smoking cessation     Vol-Care Rx oral tablet  -- 1 tab(s) by mouth once a day  -- Indication: For Vitamin

## 2017-07-13 NOTE — DISCHARGE NOTE ADULT - HOSPITAL COURSE
62 year old male with PMH Sev MR, Chronic Diastolic CHF, CAD s/p CABG/TMR & Multiple PCI (last 4/27/17), HTN, HLD, Mild Pulm HTN, Former Smoker (30 p/y, quit 2015), Emphysema on CTA (home O2 x 2 months), KALEY (? CPAP), CKD, Obesity, DM (A1C 7.4), Diabetic Retinopathy, PVD, Peripheral Neuropathy, Charcot Foot, Multiple Amputations, GERD, Barretts Esophagus, Spinal Stenosis, L-Wrist fx, Depression, Cataract Surgery. 7/12 s/p mitral clip with pulmonary vascular congestion likely due to acute on chronic diastolic heart failure on post-op CXR for which he was diuresed. 7/13 Follow up pre-discharge TTE done showing _______________ . 62 year old male with PMH Sev MR, Chronic Diastolic CHF, CAD s/p CABG/TMR & Multiple PCI (last 4/27/17), HTN, HLD, Mild Pulm HTN, Former Smoker (30 p/y, quit 2015), Emphysema on CTA (home O2 x 2 months), KALEY (? CPAP), CKD, Obesity, DM (A1C 7.4), Diabetic Retinopathy, PVD, Peripheral Neuropathy, Charcot Foot, Multiple Amputations, GERD, Barretts Esophagus, Spinal Stenosis, L-Wrist fx, Depression, Cataract Surgery. 7/12 s/p mitral clip with pulmonary vascular congestion likely due to acute on chronic diastolic heart failure on post-op CXR for which he was diuresed. 7/13 Follow up pre-discharge TTE done showing   1. The left atrium is normal in size.   2. Color Doppler demonstrates the presence of a shunt at the Atrial   level.   3. There is mild septal left ventricular hypertrophy.   4. Left ventricular ejection fraction, by visual estimation, is 55 to   60%. Grade III diastolic dysfunction.   5. Elevated left atrial and left ventricular end-diastolic pressures.   (LAP >30 mm Hg)   6. The right atrium is normal in size.   7. Normal right ventricular size and function.   8. Mitral clip visualized. One clip visualized. A2-P2 medially. Well   seated clip. trivial mitral regurgitation. Mean gradient = 4 mm hg   (@heart rate = 59 bpm). Mitral valve area by pressure half time = 2.8   cmsq.   9. Trace mitral valve regurgitation.  10. There is no evidence of pericardial effusion.  Per Dr. Vu, patient is clear to be discharged.

## 2017-07-14 RX ORDER — METOPROLOL SUCCINATE 200 MG/1
200 TABLET, EXTENDED RELEASE ORAL
Refills: 0 | Status: ACTIVE | COMMUNITY

## 2017-07-14 RX ORDER — ISOSORBIDE MONONITRATE 60 MG
60 TABLET, EXTENDED RELEASE 24 HR ORAL
Refills: 0 | Status: ACTIVE | COMMUNITY

## 2017-07-14 RX ORDER — METOLAZONE 2.5 MG
2.5 TABLET ORAL
Refills: 0 | Status: DISCONTINUED | COMMUNITY
End: 2017-07-14

## 2017-07-14 RX ORDER — METOPROLOL TARTRATE 25 MG/1
25 TABLET, FILM COATED ORAL
Refills: 0 | Status: DISCONTINUED | COMMUNITY
End: 2017-07-14

## 2017-07-14 RX ORDER — DONEPEZIL HYDROCHLORIDE 5 MG/1
5 TABLET, FILM COATED ORAL
Refills: 0 | Status: ACTIVE | COMMUNITY

## 2017-07-14 RX ORDER — LISINOPRIL 5 MG/1
5 TABLET ORAL
Refills: 0 | Status: ACTIVE | COMMUNITY

## 2017-08-01 ENCOUNTER — APPOINTMENT (OUTPATIENT)
Dept: CARDIOTHORACIC SURGERY | Facility: CLINIC | Age: 62
End: 2017-08-01
Payer: MEDICARE

## 2017-08-01 VITALS
WEIGHT: 176 LBS | SYSTOLIC BLOOD PRESSURE: 119 MMHG | BODY MASS INDEX: 28.28 KG/M2 | OXYGEN SATURATION: 96 % | HEIGHT: 66 IN | HEART RATE: 64 BPM | DIASTOLIC BLOOD PRESSURE: 68 MMHG | RESPIRATION RATE: 16 BRPM

## 2017-08-01 PROCEDURE — 99024 POSTOP FOLLOW-UP VISIT: CPT

## 2017-10-11 PROCEDURE — 82550 ASSAY OF CK (CPK): CPT

## 2017-10-11 PROCEDURE — 82947 ASSAY GLUCOSE BLOOD QUANT: CPT

## 2017-10-11 PROCEDURE — 86850 RBC ANTIBODY SCREEN: CPT

## 2017-10-11 PROCEDURE — 85027 COMPLETE CBC AUTOMATED: CPT

## 2017-10-11 PROCEDURE — 94640 AIRWAY INHALATION TREATMENT: CPT

## 2017-10-11 PROCEDURE — 80053 COMPREHEN METABOLIC PANEL: CPT

## 2017-10-11 PROCEDURE — 84295 ASSAY OF SERUM SODIUM: CPT

## 2017-10-11 PROCEDURE — 83735 ASSAY OF MAGNESIUM: CPT

## 2017-10-11 PROCEDURE — 93880 EXTRACRANIAL BILAT STUDY: CPT

## 2017-10-11 PROCEDURE — 86920 COMPATIBILITY TEST SPIN: CPT

## 2017-10-11 PROCEDURE — 93306 TTE W/DOPPLER COMPLETE: CPT

## 2017-10-11 PROCEDURE — 84443 ASSAY THYROID STIM HORMONE: CPT

## 2017-10-11 PROCEDURE — 85730 THROMBOPLASTIN TIME PARTIAL: CPT

## 2017-10-11 PROCEDURE — 82435 ASSAY OF BLOOD CHLORIDE: CPT

## 2017-10-11 PROCEDURE — 82330 ASSAY OF CALCIUM: CPT

## 2017-10-11 PROCEDURE — 83880 ASSAY OF NATRIURETIC PEPTIDE: CPT

## 2017-10-11 PROCEDURE — 84134 ASSAY OF PREALBUMIN: CPT

## 2017-10-11 PROCEDURE — 94010 BREATHING CAPACITY TEST: CPT

## 2017-10-11 PROCEDURE — 87640 STAPH A DNA AMP PROBE: CPT

## 2017-10-11 PROCEDURE — 85610 PROTHROMBIN TIME: CPT

## 2017-10-11 PROCEDURE — 36415 COLL VENOUS BLD VENIPUNCTURE: CPT

## 2017-10-11 PROCEDURE — 93325 DOPPLER ECHO COLOR FLOW MAPG: CPT

## 2017-10-11 PROCEDURE — 76770 US EXAM ABDO BACK WALL COMP: CPT

## 2017-10-11 PROCEDURE — 93312 ECHO TRANSESOPHAGEAL: CPT

## 2017-10-11 PROCEDURE — 82803 BLOOD GASES ANY COMBINATION: CPT

## 2017-10-11 PROCEDURE — 71045 X-RAY EXAM CHEST 1 VIEW: CPT

## 2017-10-11 PROCEDURE — 84484 ASSAY OF TROPONIN QUANT: CPT

## 2017-10-11 PROCEDURE — 93005 ELECTROCARDIOGRAM TRACING: CPT

## 2017-10-11 PROCEDURE — 94760 N-INVAS EAR/PLS OXIMETRY 1: CPT

## 2017-10-11 PROCEDURE — 84100 ASSAY OF PHOSPHORUS: CPT

## 2017-10-11 PROCEDURE — 83036 HEMOGLOBIN GLYCOSYLATED A1C: CPT

## 2017-10-11 PROCEDURE — 80048 BASIC METABOLIC PNL TOTAL CA: CPT

## 2017-10-11 PROCEDURE — 84132 ASSAY OF SERUM POTASSIUM: CPT

## 2017-10-11 PROCEDURE — C1894: CPT

## 2017-10-11 PROCEDURE — C1889: CPT

## 2017-10-11 PROCEDURE — 86901 BLOOD TYPING SEROLOGIC RH(D): CPT

## 2017-10-11 PROCEDURE — 87641 MR-STAPH DNA AMP PROBE: CPT

## 2017-10-11 PROCEDURE — 93320 DOPPLER ECHO COMPLETE: CPT

## 2017-10-11 PROCEDURE — 76000 FLUOROSCOPY <1 HR PHYS/QHP: CPT

## 2017-10-11 PROCEDURE — 85576 BLOOD PLATELET AGGREGATION: CPT

## 2017-10-11 PROCEDURE — 86900 BLOOD TYPING SEROLOGIC ABO: CPT

## 2017-10-11 PROCEDURE — 71250 CT THORAX DX C-: CPT

## 2017-10-11 PROCEDURE — 81001 URINALYSIS AUTO W/SCOPE: CPT

## 2017-10-11 PROCEDURE — 83605 ASSAY OF LACTIC ACID: CPT

## 2017-10-11 PROCEDURE — 85014 HEMATOCRIT: CPT

## 2017-10-11 PROCEDURE — 97163 PT EVAL HIGH COMPLEX 45 MIN: CPT

## 2020-08-12 NOTE — CONSULT NOTE ADULT - PROBLEM SELECTOR PROBLEM 7
Attending Note       The Resident's history was reviewed and patient interviewed.    The History is confirmed    Brief history: Agreed with the resident      The Resident's physical exam was reviewed and patient examined.    Physical confirmed    Brief Physical: Pt has a 2.5 cm superficial linear lac on the R wrist. No evidence of FB. Bleeding is controlled.     The Assessment and plan were reviewed with the resident.      I confirm the clinical impression.    I have reviewed the residents care plan and agree with the planned approach.      I was physically present during the key portions of the patients History, Physical and Procedures.    Vitals  Vitals:    08/12/20 0800   BP: 132/86   Pulse: 74   Resp: 16   Temp: 98.6 °F (37 °C)   TempSrc: Oral   SpO2: 98%       ED Course    9:13 AM - I introduced myself to the pt, who is laying comfortably in his bed. We discussed the plan of care. The pt understands and agrees with the plan of care. All questions were addressed.    Patient re-check: Wound repaired successfully. Counseled regarding wound care and s/s infection. Given explicit RTED instructions and patient verbalized understanding.     [facility]  Critical Care time spent on this patient outside of billable procedures:  None        I have reviewed the information recorded by the scribe for accuracy and agree with its contents.    ____________________________________________________________________    Manpreet Garces acting as a scribe for Dr. Oralia Reynolds   Dictation # [id number]  Scribe: Manpreet Reynolds MD  08/14/20 7338     Prophylactic measure

## 2021-02-04 NOTE — PROGRESS NOTE ADULT - PROBLEM/PLAN-2
Follow-up hematoma left buttocks    80-year-old gentleman follows up after having CT completed of the pelvis.  He states no significant change in symptoms.  He continues to have some mild discomfort at the site with direct pressure, but on a day-to-day basis is not bothering him much.  He does not think it is substantially smaller.    Review of systems:  Constitutional: Negative for fever, chills, change in weight  Respiratory: Negative for cough or wheezing    Physical exam  Body mass index 33.1  General: Awake alert and oriented, no distress  Eyes: Extraocular movements are intact, no icterus  Neck: Supple, trachea midline  Respiratory: No use of accessory muscles, good bilateral chest expansion  Gastrointestinal: Abdomen is soft benign, no hernia felt, no mass  Buttocks: In the left buttock soft tissue there is a firm mass, partially mobile and mildly tender.    CT images are reviewed.  The images are consistent with hematoma or scar related to his traumatic injury to the buttocks.  The lesion appears to be superficial to the musculature and there does appear to be some normal healthy skin flaps between the lesion and the surface.    Assessment and plan:  -Left buttocks traumatic injury and hematoma  -Options discussed with patient.  We discussed surgical excision versus observation  -Risks associated with excision noted to include scarring, bleeding, infection and possible postoperative pain  -Patient states that at this time since his symptoms are relatively minimal he would prefer observation which I think is reasonable.  I told him that it is reasonable to use over-the-counter Tylenol or ibuprofen to control pain symptoms.  He is welcome to call back to be considered for surgical excision if he has further concerns.    Chad Sands MD  General and Endoscopic Surgery  [facility]    [address], [address]  [place], [place], [address]  P: [phone]  F: [phone]      
DISPLAY PLAN FREE TEXT

## 2023-03-07 NOTE — DISCHARGE NOTE ADULT - CARE PROVIDERS DIRECT ADDRESSES
Improved
,[email or web address]
Initial (On Arrival)
